# Patient Record
Sex: FEMALE | Race: WHITE | HISPANIC OR LATINO | Employment: UNEMPLOYED | URBAN - METROPOLITAN AREA
[De-identification: names, ages, dates, MRNs, and addresses within clinical notes are randomized per-mention and may not be internally consistent; named-entity substitution may affect disease eponyms.]

---

## 2024-04-02 ENCOUNTER — INITIAL PRENATAL (OUTPATIENT)
Dept: OBSTETRICS AND GYNECOLOGY | Facility: CLINIC | Age: 34
End: 2024-04-02
Payer: COMMERCIAL

## 2024-04-02 VITALS — SYSTOLIC BLOOD PRESSURE: 134 MMHG | DIASTOLIC BLOOD PRESSURE: 83 MMHG | WEIGHT: 161.4 LBS

## 2024-04-02 DIAGNOSIS — Z34.91 INITIAL OBSTETRIC VISIT IN FIRST TRIMESTER: Primary | ICD-10-CM

## 2024-04-02 DIAGNOSIS — O09.32 INITIAL OBSTETRIC VISIT IN SECOND TRIMESTER: ICD-10-CM

## 2024-04-02 PROCEDURE — 99204 OFFICE O/P NEW MOD 45 MIN: CPT | Performed by: OBSTETRICS & GYNECOLOGY

## 2024-04-02 RX ORDER — LANOLIN ALCOHOL/MO/W.PET/CERES
400 CREAM (GRAM) TOPICAL DAILY
COMMUNITY

## 2024-04-02 RX ORDER — VITAMIN A, ASCORBIC ACID, CHOLECALCIFEROL, .ALPHA.-TOCOPHEROL ACETATE, DL-, THIAMINE MONONITRATE, RIBOFLAVIN, NIACINAMIDE, PYRIDOXINE HYDROCHLORIDE, FOLIC ACID, CYANOCOBALAMIN, CALCIUM CARBONATE, IRON, ZINC OXIDE, AND CUPRIC OXIDE 4000; 120; 400; 22; 1.84; 3; 20; 10; 1; 12; 200; 29; 25; 2 [IU]/1; MG/1; [IU]/1; [IU]/1; MG/1; MG/1; MG/1; MG/1; MG/1; UG/1; MG/1; MG/1; MG/1; MG/1
1 TABLET ORAL DAILY
Qty: 30 TABLET | Refills: 11 | Status: SHIPPED | OUTPATIENT
Start: 2024-04-02

## 2024-04-02 RX ORDER — DIPHENHYDRAMINE HYDROCHLORIDE 25 MG/1
25 CAPSULE ORAL 3 TIMES DAILY
Qty: 90 TABLET | Refills: 3 | Status: SHIPPED | OUTPATIENT
Start: 2024-04-02

## 2024-04-02 NOTE — PROGRESS NOTES
CC: Initial obstetric visit    HPI: 33-year-old  1 para 0 presents at 6-6/7 weeks by her certain last menstrual cycle for an initial obstetric visit.  Overall, she is feeling well.  She does have nausea, but no vomiting.  Denies abdominal or pelvic pain.  Denies vaginal bleeding.    Review of systems    This is negative for fever or chills.  Negative for abdominal or pelvic pain.  Negative for vaginal bleeding.  Positive for nausea.  All other systems are reviewed and are negative.    Assessment and plan    1.  Intrauterine pregnancy at 6-6/7 weeks.  I recommend an ultrasound to confirm estimated gestational age and the patient agrees.  2.  Prenatal counseling was undertaken and questions were answered.  Prenatal labs today.  Prenatal vitamins have been prescribed.  3.  Nausea with minimal vomiting.  Counseled.  We discussed strategies to minimize this such as frequent small meals, the use of yevgeniy and the use of vitamin B6.  4.  Counseled regarding genetic screening.  The patient is considering noninvasive prenatal screening.

## 2024-04-03 LAB
ABO GROUP BLD: NORMAL
AMPHETAMINES UR QL SCN: NEGATIVE NG/ML
BARBITURATES UR QL SCN: NEGATIVE NG/ML
BASOPHILS # BLD AUTO: 0 X10E3/UL (ref 0–0.2)
BASOPHILS NFR BLD AUTO: 0 %
BENZODIAZ UR QL: NEGATIVE NG/ML
BLD GP AB SCN SERPL QL: NEGATIVE
BZE UR QL: NEGATIVE NG/ML
CANNABINOIDS UR QL SCN: NEGATIVE NG/ML
EOSINOPHIL # BLD AUTO: 0 X10E3/UL (ref 0–0.4)
EOSINOPHIL NFR BLD AUTO: 0 %
ERYTHROCYTE [DISTWIDTH] IN BLOOD BY AUTOMATED COUNT: 13.3 % (ref 11.7–15.4)
HBV SURFACE AG SERPL QL IA: NEGATIVE
HCT VFR BLD AUTO: 40.2 % (ref 34–46.6)
HCV IGG SERPL QL IA: NON REACTIVE
HGB BLD-MCNC: 13.2 G/DL (ref 11.1–15.9)
HIV 1+2 AB+HIV1 P24 AG SERPL QL IA: NON REACTIVE
IMM GRANULOCYTES # BLD AUTO: 0 X10E3/UL (ref 0–0.1)
IMM GRANULOCYTES NFR BLD AUTO: 0 %
LYMPHOCYTES # BLD AUTO: 2.4 X10E3/UL (ref 0.7–3.1)
LYMPHOCYTES NFR BLD AUTO: 26 %
MCH RBC QN AUTO: 28.8 PG (ref 26.6–33)
MCHC RBC AUTO-ENTMCNC: 32.8 G/DL (ref 31.5–35.7)
MCV RBC AUTO: 88 FL (ref 79–97)
METHADONE UR QL SCN: NEGATIVE NG/ML
MONOCYTES # BLD AUTO: 0.7 X10E3/UL (ref 0.1–0.9)
MONOCYTES NFR BLD AUTO: 7 %
NEUTROPHILS # BLD AUTO: 6.3 X10E3/UL (ref 1.4–7)
NEUTROPHILS NFR BLD AUTO: 67 %
OPIATES UR QL: NEGATIVE NG/ML
PCP UR QL SCN: NEGATIVE NG/ML
PLATELET # BLD AUTO: 182 X10E3/UL (ref 150–450)
PROPOXYPH UR QL SCN: NEGATIVE NG/ML
RBC # BLD AUTO: 4.59 X10E6/UL (ref 3.77–5.28)
RH BLD: NEGATIVE
RPR SER QL: NON REACTIVE
RUBV IGG SERPL IA-ACNC: 2.57 INDEX
WBC # BLD AUTO: 9.4 X10E3/UL (ref 3.4–10.8)

## 2024-04-04 LAB
BACTERIA UR CULT: NO GROWTH
BACTERIA UR CULT: NORMAL

## 2024-04-05 LAB
C TRACH RRNA CVX QL NAA+PROBE: NEGATIVE
CONV .: NORMAL
CYTOLOGIST CVX/VAG CYTO: NORMAL
CYTOLOGY CVX/VAG DOC CYTO: NORMAL
CYTOLOGY CVX/VAG DOC THIN PREP: NORMAL
DX ICD CODE: NORMAL
Lab: NORMAL
N GONORRHOEA RRNA CVX QL NAA+PROBE: NEGATIVE
OTHER STN SPEC: NORMAL
STAT OF ADQ CVX/VAG CYTO-IMP: NORMAL
T VAGINALIS RRNA SPEC QL NAA+PROBE: NEGATIVE

## 2024-04-30 ENCOUNTER — ROUTINE PRENATAL (OUTPATIENT)
Dept: OBSTETRICS AND GYNECOLOGY | Facility: CLINIC | Age: 34
End: 2024-04-30
Payer: COMMERCIAL

## 2024-04-30 VITALS — WEIGHT: 161.8 LBS | SYSTOLIC BLOOD PRESSURE: 126 MMHG | DIASTOLIC BLOOD PRESSURE: 82 MMHG

## 2024-04-30 DIAGNOSIS — Z3A.10 10 WEEKS GESTATION OF PREGNANCY: Primary | ICD-10-CM

## 2024-04-30 DIAGNOSIS — N89.8 DISCHARGE FROM THE VAGINA: ICD-10-CM

## 2024-04-30 LAB
GLUCOSE UR STRIP-MCNC: NEGATIVE MG/DL
PROT UR STRIP-MCNC: NEGATIVE MG/DL

## 2024-04-30 PROCEDURE — 99213 OFFICE O/P EST LOW 20 MIN: CPT | Performed by: OBSTETRICS & GYNECOLOGY

## 2024-04-30 RX ORDER — PROMETHAZINE HYDROCHLORIDE 12.5 MG/1
12.5 TABLET ORAL EVERY 6 HOURS PRN
Qty: 30 TABLET | Refills: 0 | Status: SHIPPED | OUTPATIENT
Start: 2024-04-30

## 2024-04-30 NOTE — PROGRESS NOTES
CC: Obstetric visit    HPI: 33-year-old  1 para 0 presents at 10-6/7 weeks for her obstetric visit.  She vomits approximately twice daily.  Otherwise, she is feeling well.  She does complain of a thin vaginal discharge.    Review of systems    This is negative for fever or chills.  Positive for nausea and vomiting.  Negative for vaginal bleeding.    Physical examination    The abdomen is soft and gravid.  There is no epigastric tenderness.  No fundal tenderness.  No suprapubic tenderness.  Extremities are equal in size    Assessment and plan    1.  Intrauterine pregnancy at 10-6/7 weeks  2.  Today's ultrasound was ordered due to history of subchorionic hematoma.  This shows complete resolution of the hematoma and confirms the patient's gestational age.  3.  Nausea and vomiting.  Counseled.  The patient is eating frequent small meals and using ginger ale.  We will add as needed Phenergan.  4.  Vaginal discharge.  On physical examination, this appears physiologic.  Cultures for BV and yeast performed.  5.  Counseled regarding genetic screening.  The patient would like to proceed with free fetal DNA testing and cystic fibrosis carrier screening.

## 2024-05-02 ENCOUNTER — TELEPHONE (OUTPATIENT)
Dept: OBSTETRICS AND GYNECOLOGY | Facility: CLINIC | Age: 34
End: 2024-05-02
Payer: COMMERCIAL

## 2024-05-02 LAB
A VAGINAE DNA VAG QL NAA+PROBE: ABNORMAL SCORE
BVAB2 DNA VAG QL NAA+PROBE: ABNORMAL SCORE
C ALBICANS DNA VAG QL NAA+PROBE: POSITIVE
C GLABRATA DNA VAG QL NAA+PROBE: NEGATIVE
C TRACH DNA VAG QL NAA+PROBE: NEGATIVE
MEGA1 DNA VAG QL NAA+PROBE: ABNORMAL SCORE
N GONORRHOEA DNA VAG QL NAA+PROBE: NEGATIVE
T VAGINALIS DNA VAG QL NAA+PROBE: NEGATIVE

## 2024-05-04 LAB
CFDNA.FET/CFDNA.TOTAL SFR FETUS: NORMAL %
CITATION REF LAB TEST: NORMAL
FET 13+18+21+X+Y ANEUP PLAS.CFDNA: NEGATIVE
FET CHR 21 TS PLAS.CFDNA QL: NEGATIVE
FET SEX PLAS.CFDNA DOSAGE CFDNA: NORMAL
FET TS 13 RISK PLAS.CFDNA QL: NEGATIVE
FET TS 18 RISK WBC.DNA+CFDNA QL: NEGATIVE
GA EST FROM CONCEPTION DATE: NORMAL D
GESTATIONAL AGE > 9:: YES
LAB DIRECTOR NAME PROVIDER: NORMAL
LAB DIRECTOR NAME PROVIDER: NORMAL
LABORATORY COMMENT REPORT: NORMAL
LIMITATIONS OF THE TEST: NORMAL
NEGATIVE PREDICTIVE VALUE: NORMAL
NOTE: NORMAL
PERFORMANCE CHARACTERISTICS: NORMAL
POSITIVE PREDICTIVE VALUE: NORMAL
REF LAB TEST METHOD: NORMAL
TEST PERFORMANCE INFO SPEC: NORMAL

## 2024-05-07 ENCOUNTER — TELEPHONE (OUTPATIENT)
Dept: OBSTETRICS AND GYNECOLOGY | Facility: CLINIC | Age: 34
End: 2024-05-07
Payer: COMMERCIAL

## 2024-05-22 LAB
CFTR FULL MUT ANL BLD/T SEQ: NORMAL
CITATION REF LAB TEST: NORMAL
CLINICAL INFO: NORMAL
ETHNIC BACKGROUND STATED: NORMAL
GENE DIS ANL CARRIER INTERP-IMP: NORMAL
LAB DIRECTOR NAME PROVIDER: NORMAL
REASON FOR REFERRAL (NARRATIVE): NORMAL
RECOMMENDATION PATIENT DOC-IMP: NORMAL
REF LAB TEST METHOD: NORMAL
SERVICE CMNT-IMP: NORMAL
SPECIMEN SOURCE: NORMAL

## 2024-05-28 ENCOUNTER — ROUTINE PRENATAL (OUTPATIENT)
Dept: OBSTETRICS AND GYNECOLOGY | Facility: CLINIC | Age: 34
End: 2024-05-28
Payer: COMMERCIAL

## 2024-05-28 VITALS — WEIGHT: 163 LBS | SYSTOLIC BLOOD PRESSURE: 121 MMHG | DIASTOLIC BLOOD PRESSURE: 79 MMHG

## 2024-05-28 DIAGNOSIS — M25.551 PAIN OF RIGHT HIP: ICD-10-CM

## 2024-05-28 DIAGNOSIS — Z13.89 SCREENING FOR BLOOD OR PROTEIN IN URINE: ICD-10-CM

## 2024-05-28 DIAGNOSIS — M54.50 ACUTE BILATERAL LOW BACK PAIN, UNSPECIFIED WHETHER SCIATICA PRESENT: ICD-10-CM

## 2024-05-28 DIAGNOSIS — Z3A.14 14 WEEKS GESTATION OF PREGNANCY: Primary | ICD-10-CM

## 2024-05-28 LAB
BILIRUB BLD-MCNC: NEGATIVE MG/DL
CLARITY, POC: ABNORMAL
COLOR UR: YELLOW
GLUCOSE UR STRIP-MCNC: NEGATIVE MG/DL
KETONES UR QL: NEGATIVE
LEUKOCYTE EST, POC: ABNORMAL
NITRITE UR-MCNC: NEGATIVE MG/ML
PH UR: 6.5 [PH] (ref 5–8)
PROT UR STRIP-MCNC: NEGATIVE MG/DL
RBC # UR STRIP: ABNORMAL /UL
SP GR UR: 1.01 (ref 1–1.03)
UROBILINOGEN UR QL: NORMAL

## 2024-05-28 PROCEDURE — 99213 OFFICE O/P EST LOW 20 MIN: CPT | Performed by: OBSTETRICS & GYNECOLOGY

## 2024-05-28 RX ORDER — FAMOTIDINE 20 MG/1
20 TABLET, FILM COATED ORAL DAILY
Qty: 30 TABLET | Refills: 1 | Status: SHIPPED | OUTPATIENT
Start: 2024-05-28 | End: 2025-05-28

## 2024-05-28 NOTE — PROGRESS NOTES
CC: Obstetric visit    HPI: 33-year-old  1 para 0 presents at 14-6/7 weeks for her obstetric visit.  She complains of pain in the right hip which is better when she walks and is more severe when she is laying down.  Otherwise, she is feeling well.    Review of systems    This is negative for nausea and vomiting.  Negative for fever and chills.  Negative for vaginal bleeding.    Physical examination    The abdomen is soft and gravid.  There is no epigastric tenderness.  No fundal tenderness.  No right upper quadrant tenderness.  No suprapubic tenderness.  Extremities are equal in size    Assessment and plan    1.  Intrauterine pregnancy at 14-6/7 weeks  2.  Right hip pain.  This appears on physical examination to be localized to the right hip.  It is not present at the costovertebral angle.  Urine has been sent for culture, but I believe the pain is related to the right hip joint.  I recommend physical therapy and the patient agrees.  A referral has been sent.  3.  Nausea and vomiting have resolved.  4.  Unable to Doppler fetal heart tones at bedside.  We will check a limited ultrasound to confirm viability and estimated gestational age.

## 2024-05-29 ENCOUNTER — PATIENT MESSAGE (OUTPATIENT)
Dept: OBSTETRICS AND GYNECOLOGY | Facility: CLINIC | Age: 34
End: 2024-05-29
Payer: COMMERCIAL

## 2024-05-29 NOTE — TELEPHONE ENCOUNTER
Pt message translated:   I need something for the back pain in the right lumbar region that he advises me to take, I can barely walk because it has worsened.    Please advise, thank you!

## 2024-05-29 NOTE — TELEPHONE ENCOUNTER
This was also sent to me as a phone call earlier.  Please contact the patient and advised her to come to the emergency room.  Pain so severe that she cannot walk is not something that will be handled by a telephone prescription.  Thank you.

## 2024-05-29 NOTE — TELEPHONE ENCOUNTER
Called patient  because she had not been to ED yet. Patient states she is feeling better and feels she does not need to go at this time but will if it starts again.

## 2024-05-30 LAB
BACTERIA UR CULT: NO GROWTH
BACTERIA UR CULT: NORMAL

## 2024-06-11 ENCOUNTER — HOSPITAL ENCOUNTER (OUTPATIENT)
Dept: PHYSICAL THERAPY | Facility: HOSPITAL | Age: 34
Setting detail: THERAPIES SERIES
Discharge: HOME | End: 2024-06-11
Payer: COMMERCIAL

## 2024-06-11 DIAGNOSIS — M25.551 RIGHT HIP PAIN: Primary | ICD-10-CM

## 2024-06-11 PROCEDURE — 97162 PT EVAL MOD COMPLEX 30 MIN: CPT

## 2024-06-11 NOTE — THERAPY EVALUATION
Outpatient Physical Therapy Pelvic Health Initial Evaluation  [facility]     Patient Name: Latha Dawson  : 1990  MRN: [id number]  Today's Date: 2024        Visit Date: 2024    There is no problem list on file for this patient.       No past medical history on file.     Past Surgical History:   Procedure Laterality Date    OVARY BIOPSY           Visit Dx:    ICD-10-CM ICD-9-CM   1. Right hip pain  M25.551 719.45        Patient History       Row Name 24 1400             Daily Activities    Primary Language Mexican   -CC      Action taken if English not primary   -CC      Does patient have problems with the following? None  -CC      Barriers to learning Language  -CC      Pt Participated in POC and Goals Yes  -CC                User Key  (r) = Recorded By, (t) = Taken By, (c) = Cosigned By      Initials Name Provider Type    CC Elli Edwards, BELIA Physical Therapist                     Pelvic Health       Row Name 24 1400             Subjective    Patient Reason for Visit Hip Pain;Prenatal Care  -CC      Brief Description of Chief Complaint Latha Dawson is a 33 y.o. female who presents today with R hip pain during pregnancy. Pt is currently 16w6d GA, . Onset of pain about 1 month ago. Pt says she prefers lying on her back for sleeping, but OB recommended she sleeping on her left side, and feels the pain started around this time. She does use a pillow between her knees. Latha Dawson reports difficulty/increased pain with sleeping, navigating steps. Pain relieving factors include rest. Pt has returned to sleeping on her back without use of pregnancy pillow which has helped pain. PMH neck pain  -CC      Patient Participated in POC and Goals Yes  -CC         Daily Activities    Primary Language Mexican   -CC      Action taken if English not primary   -CC      Does patient have problems with the following? None   -CC      Barriers to learning Language  -CC      Pt Participated in POC and Goals Yes  -CC         Pregnancy/Sexual History    Number of Pregnancies 1  -CC         Pain Assessment    Pain Score 4  -CC         Lumbar/SI Special Tests    FAIR Test (Piriformis Syndrome) Right:;Positive  -CC         General ROM    GENERAL ROM COMMENTS lumbar and hip ROM WFL, some pain with lumbar rot brigette  -CC         Education Provided On:    Education Points HEP  -CC      Method of Delivery Verbal;Demonstration;Written  -CC      Education Provided To Patient  -CC      Level of Understanding Teach back education performed;Verbalized;Demonstrated  -CC      HEP Comments Access Code [email or web address]  -CC      Education Comments Anatomical changes during pregnancy, eval findings, goals of PT, POC  -CC         MMT (Manual Muscle Testing)    Rt Lower Ext Rt Hip Flexion;Rt Hip ABduction;Rt Knee Extension;Rt Knee Flexion  -CC      Lt Lower Ext Lt Hip Flexion;Lt Hip ABduction;Lt Knee Extension;Lt Knee Flexion  -CC         MMT Right Lower Ext    Rt Hip Flexion MMT, Gross Movement (5/5) normal  -CC      Rt Hip ABduction MMT, Gross Movement (4-/5) good minus  pain  -CC      Rt Knee Extension MMT, Gross Movement (5/5) normal  -CC      Rt Knee Flexion MMT, Gross Movement (5/5) normal  -CC         MMT Left Lower Ext    Lt Hip Flexion MMT, Gross Movement (5/5) normal  -CC      Lt Hip ABduction MMT, Gross Movement (4+/5) good plus  -CC      Lt Knee Extension MMT, Gross Movement (5/5) normal  -CC      Lt Knee Flexion MMT, Gross Movement (5/5) normal  -CC                User Key  (r) = Recorded By, (t) = Taken By, (c) = Cosigned By      Initials Name Provider Type    Elli Swan, PT Physical Therapist                   PT Ortho       Row Name 06/11/24 1400       Lumbosacral Palpation    Lumbosacral Palpation? Yes  -CC    SI Right:;Tender  -CC    Quadratus Lumborum Right:;Tender  -CC    Erector Spinae (Paraspinals) Right:;Tender;Guarded/taut  -CC        Hip/Thigh Palpation    Piriformis Right:;Tender  -CC              User Key  (r) = Recorded By, (t) = Taken By, (c) = Cosigned By      Initials Name Provider Type    CC Elli Edwards, PT Physical Therapist                                PT Assessment/Plan       Row Name 06/11/24 1400          PT Assessment    Functional Limitations Limitation in home management;Limitations in community activities;Limitations in functional capacity and performance;Performance in leisure activities;Performance in self-care ADL  -CC     Impairments Posture;Poor body mechanics;Pain;Muscle strength;Impaired flexibility  -CC     Assessment Comments Latha Dawson is a 33 y.o. female referred to physical therapy for R hip pain during pregnancy, currently 16 weeks, 6d GA, KEYANNA 11/20/24. She presents with an evolving clinical presentation, along with no remarkable comorbidities and personal factors of communication barrier requiring  that may impact her progress in the plan of care. Pt presents today with TTP R SIJ/lumbar tissues/R hip girdle, decreased R hip girdle strength, and (+) FADIR test. Her signs and symptoms are consistent with referring diagnosis. The previous impairments limit her ability to sleep though the night without pain, and navigating steps. Pt will benefit from skilled PT to address the previous impairments and return to PLOF.  -CC     Please refer to paper survey for additional self-reported information No  -CC     Rehab Potential Good  -CC     Patient/caregiver participated in establishment of treatment plan and goals Yes  -CC     Patient would benefit from skilled therapy intervention Yes  -CC        PT Plan    PT Frequency 1x/week  -CC     Predicted Duration of Therapy Intervention (PT) 6-8 visits  -CC     Planned CPT's? PT EVAL MOD COMPLELITY: 61867;PT RE-EVAL: 86246;PT THER PROC EA 15 MIN: 50747;PT THER ACT EA 15 MIN: 34827;PT MANUAL THERAPY EA 15 MIN: 01434;PT NEUROMUSC RE-EDUCATION EA 15  MIN: 85735;PT GAIT TRAINING EA 15 MIN: 87079;PT SELF CARE/HOME MGMT/TRAIN EA 15: 24823;PT HOT OR COLD PACK TREAT MCARE  -CC     PT Plan Comments Next visit: STM R lumbar tissues and R hip girdle, HL hip abd/add, PPT, qped TA  -CC               User Key  (r) = Recorded By, (t) = Taken By, (c) = Cosigned By      Initials Name Provider Type    Elli Swan, PT Physical Therapist                       OP Exercises       Row Name 24 1500 24 1400          Subjective    Patient Reason for Visit -- Hip Pain;Prenatal Care  -CC     Brief Description of Chief Complaint -- Latha Dawson is a 33 y.o. female who presents today with R hip pain during pregnancy. Pt is currently 16w6d GA, . Onset of pain about 1 month ago. Pt says she prefers lying on her back for sleeping, but OB recommended she sleeping on her left side, and feels the pain started around this time. She does use a pillow between her knees. Latha Dawson reports difficulty/increased pain with sleeping, navigating steps. Pain relieving factors include rest. Pt has returned to sleeping on her back without use of pregnancy pillow which has helped pain. PMH neck pain  -CC     Patient Participated in POC and Goals -- Yes  -CC        Total Minutes    97777 - PT Therapeutic Exercise Minutes 2  -CC --        Exercise 1    Exercise Name 1 EOB piriformis str  -CC --     Cueing 1 Verbal;Demo  -CC --     Reps 1 3 R  -CC --     Time 1 20sec  -CC --               User Key  (r) = Recorded By, (t) = Taken By, (c) = Cosigned By      Initials Name Provider Type    CC Elli Edwards, PT Physical Therapist                                 PT OP Goals       Row Name 24 1500          PT Short Term Goals    STG Date to Achieve 24  -CC     STG 1 Pt will be independent with initial HEP for hip pain reduction during pregnancy.  -CC     STG 1 Progress New  -CC     STG 2 Minimize nighttime back pain interference with sleep quality by  achieving at least a 50% reduction in the frequency and severity of nocturnal back pain episodes  -     STG 2 Progress New  -     STG 3 Pt will verbalize increased awareness of proper posture during sitting, standing, and walking to reduce strain on the back, with a focus on maintaining a neutral spine alignment to minimize stress of pregnancy related anatomical changes on back.  -     STG 3 Progress New  [facility]        Long Term Goals    LTG Date to Achieve 08/10/24  -     LTG 1 Pt will be independent with comprehensive HEP to address core/hip/pelvic floor strength during pregnancy to manage back pain and prepare body for labor and the postpartum period.  -     LTG 1 Progress New  [facility]     LTG 2 Pt will demo at least 4/5 R hip abd strength to improve support for back and pelvis throughout pregnancy.  -     LTG 2 Progress New  [facility]     LTG 3 Pt will verbalize good understanding of exercises and techniques to optimize pelvic floor function, improve endurance, and enhance relaxation skills in preparation for labor and delivery.  -     LTG 3 Progress New  [facility]     LTG 4 Pt will report </=2/10 hip pain with ADLs.  -     LTG 4 Progress New  [facility]        Time Calculation    PT Goal Re-Cert Due Date 09/09/24  -               User Key  (r) = Recorded By, (t) = Taken By, (c) = Cosigned By      Initials Name Provider Type    CC Elli Edwards, PT Physical Therapist                                    Time Calculation:   Start Time: 1406  Stop Time: 1433  Time Calculation (min): 27 min  Total Timed Code Minutes- PT: 2 minute(s)  Timed Charges  25324 - PT Therapeutic Exercise Minutes: 2  Untimed Charges  PT Eval/Re-eval Minutes: 25  Total Minutes  Timed Charges Total Minutes: 2  Untimed Charges Total Minutes: 25   Total Minutes: 27  Therapy Charges for Today       Code Description Service Date Service Provider Modifiers Qty    [id number] HC PT EVAL MOD COMPLEXITY 3 6/11/2024 Elli Edwards, PT GP 1                     Elli Edwards, PT  6/11/2024

## 2024-06-12 ENCOUNTER — REFERRAL TRIAGE (OUTPATIENT)
Dept: LABOR AND DELIVERY | Facility: HOSPITAL | Age: 34
End: 2024-06-12
Payer: COMMERCIAL

## 2024-06-19 ENCOUNTER — PATIENT OUTREACH (OUTPATIENT)
Dept: LABOR AND DELIVERY | Facility: HOSPITAL | Age: 34
End: 2024-06-19
Payer: COMMERCIAL

## 2024-06-19 NOTE — OUTREACH NOTE
[facility]  Enrollment    Current Estimated Gestational Age: 18w0d    Questions/Answers      Flowsheet Row Responses   Would like to participate? Yes   Date of Intake Visit 06/19/24          [facility]  Intake    Current Estimated Gestational Age: 18w0d    Intake Assessment      Flowsheet Row Responses   Best Method for Contacting Cell   Currently Employed No   Able to keep appointments as scheduled Yes   Gender(s) and Name(s) girl   Do you have a dentist? No   Resources Presently Utilizing: WIC (Women, Infant, Children)   Maternal Warning Signs Provided   Other Education Insurance benefits/Incentives, WIC Benefits, How to find a pediatrician, How to find a dentist, How to find a primary care provider, HANDS            Learning Assessment      Flowsheet Row Responses   Relationship Patient   Learner Name Latha   Does the learner have any barriers to learning? No Barriers, Language   What is the preferred language of the learner for medical teaching? [place]ian   Is an  required? Yes   How does the learner prefer to learn new concepts? Listening, Reading, Demonstration, Pictures/Video          Intake completed today with  [id number]. She is doing well and just starting to feel fetal movement. She lives with her sister in a house and she reports stable housing and reliable transportation. FOB involved but they do not live together. She had questions about location of L&D and this was reviewed. She has already received her car seat from [employer] and resources for other supplies were sent. She is already receiving WIC/SNAP. F/u around 28 weeks.     Elaine Obrien RN  Maternity Nurse Navigator    6/19/2024, 10:48 EDT          Elaine Obrien RN  Maternity Nurse Navigator    6/19/2024, 10:48 EDT

## 2024-06-21 ENCOUNTER — APPOINTMENT (OUTPATIENT)
Dept: PHYSICAL THERAPY | Facility: HOSPITAL | Age: 34
End: 2024-06-21
Payer: COMMERCIAL

## 2024-06-25 ENCOUNTER — ROUTINE PRENATAL (OUTPATIENT)
Dept: OBSTETRICS AND GYNECOLOGY | Facility: CLINIC | Age: 34
End: 2024-06-25
Payer: COMMERCIAL

## 2024-06-25 VITALS — DIASTOLIC BLOOD PRESSURE: 87 MMHG | SYSTOLIC BLOOD PRESSURE: 133 MMHG | WEIGHT: 170 LBS

## 2024-06-25 DIAGNOSIS — O26.892 PREGNANCY HEADACHE IN SECOND TRIMESTER: ICD-10-CM

## 2024-06-25 DIAGNOSIS — R51.9 PREGNANCY HEADACHE IN SECOND TRIMESTER: ICD-10-CM

## 2024-06-25 DIAGNOSIS — Z3A.18 18 WEEKS GESTATION OF PREGNANCY: Primary | ICD-10-CM

## 2024-06-25 PROCEDURE — 99213 OFFICE O/P EST LOW 20 MIN: CPT | Performed by: OBSTETRICS & GYNECOLOGY

## 2024-06-25 RX ORDER — MAGNESIUM OXIDE 400 MG/1
400 TABLET ORAL DAILY
Qty: 30 TABLET | Refills: 11 | Status: SHIPPED | OUTPATIENT
Start: 2024-06-25

## 2024-06-25 NOTE — PROGRESS NOTES
CC: Obstetric visit    HPI: 33-year-old  1 para 0 presents at 18-6/7 weeks for her obstetric visit.  She has begun to appreciate fetal movement.  She does have intermittent headaches.  She has been taking Tylenol for this but would like to take something different.    Review of systems    This is positive for headaches.  Negative for vision changes.  Negative for abdominal or pelvic pain.  Negative for vaginal bleeding.    Physical examination    The abdomen is soft and gravid.  There is no epigastric tenderness.  No fundal tenderness.  No CVA tenderness.  No suprapubic tenderness.  Extremities are equal in size    Assessment and plan    1.  Intrauterine pregnancy at 18-6/7 weeks  2.  Free fetal DNA testing and cystic fibrosis carrier screening are negative.  Counseled.  We discussed AFP testing and the patient would like to proceed.  This has been ordered.  3.  Screening ultrasound next visit.  4.  Slight elevation in blood pressure.  There are no severe range pressures and the patient is not symptomatic.  I recommend a repeat visit in 2 weeks for blood pressure check and the patient agrees.

## 2024-06-27 LAB
AFP INTERP SERPL-IMP: NORMAL
AFP INTERP SERPL-IMP: NORMAL
AFP MOM SERPL: 1.2
AFP SERPL-MCNC: 55.4 NG/ML
AGE AT DELIVERY: 32.9 YR
GA METHOD: NORMAL
GA: 18.9 WEEKS
IDDM PATIENT QL: NORMAL
LABORATORY COMMENT REPORT: NORMAL
MULTIPLE PREGNANCY: NORMAL
NEURAL TUBE DEFECT RISK FETUS: 6499 %
RESULT: NORMAL

## 2024-06-28 ENCOUNTER — APPOINTMENT (OUTPATIENT)
Dept: PHYSICAL THERAPY | Facility: HOSPITAL | Age: 34
End: 2024-06-28
Payer: COMMERCIAL

## 2024-07-12 ENCOUNTER — ROUTINE PRENATAL (OUTPATIENT)
Dept: OBSTETRICS AND GYNECOLOGY | Facility: CLINIC | Age: 34
End: 2024-07-12
Payer: COMMERCIAL

## 2024-07-12 VITALS — WEIGHT: 173.2 LBS | SYSTOLIC BLOOD PRESSURE: 125 MMHG | DIASTOLIC BLOOD PRESSURE: 77 MMHG

## 2024-07-12 DIAGNOSIS — Z3A.21 21 WEEKS GESTATION OF PREGNANCY: Primary | ICD-10-CM

## 2024-07-12 NOTE — PROGRESS NOTES
CC: Obstetric visit    HPI: 33-year-old  1 para 0 presents at 21-2/7 weeks for her obstetric visit.  She has begun to appreciate fetal movement.  Her ultrasound shows a normal anatomic survey with normal cervical length.  This is limited by suboptimal visualization of the fetal spine, face and lower extremities.    Review of systems    This is negative for fever or chills.  Negative for nausea or vomiting.  Negative for abdominal or pelvic pain.    Physical examination    The abdomen is soft and gravid.  There is no epigastric tenderness.  No fundal tenderness.  No CVA tenderness.  No suprapubic tenderness.  Extremities are equal in size    Assessment and plan    1.  Intrauterine pregnancy at 21-2/7 weeks  2.  Screening ultrasound was reviewed and discussed with the patient.  This is a normal anatomic survey except that it was limited by suboptimal views of the fetal face, lower extremities and spine.  I counseled the patient.  She understands that this does not mean abnormalities were found only, that all of the anatomy was not well-visualized.  I recommend a repeat ultrasound next visit and the patient agrees.  3.  The patient is experiencing a toothache.  She plans to go for dental checkup.  4.  1 hour glucose tolerance test at the next visit.  Counseled.

## 2024-08-09 ENCOUNTER — LAB (OUTPATIENT)
Dept: OBSTETRICS AND GYNECOLOGY | Facility: CLINIC | Age: 34
End: 2024-08-09
Payer: COMMERCIAL

## 2024-08-09 ENCOUNTER — ROUTINE PRENATAL (OUTPATIENT)
Dept: OBSTETRICS AND GYNECOLOGY | Facility: CLINIC | Age: 34
End: 2024-08-09
Payer: COMMERCIAL

## 2024-08-09 VITALS — DIASTOLIC BLOOD PRESSURE: 76 MMHG | SYSTOLIC BLOOD PRESSURE: 118 MMHG | WEIGHT: 181 LBS

## 2024-08-09 DIAGNOSIS — Z3A.25 25 WEEKS GESTATION OF PREGNANCY: Primary | ICD-10-CM

## 2024-08-09 DIAGNOSIS — Z3A.21 21 WEEKS GESTATION OF PREGNANCY: ICD-10-CM

## 2024-08-09 NOTE — PROGRESS NOTES
CC: Obstetric visit    HPI: 33-year-old  1 para 0 presents at 25-2/7 weeks for her obstetric visit.  Her baby is moving actively.  She complains of some abdominal wall stretching and itching.  She is concerned about the development of stretch marks.    Review of systems    This is negative for fever or chills.  Negative for nausea or vomiting.  Negative for abdominal or pelvic pain.    Physical examination    The abdomen is soft and gravid.  There is no epigastric tenderness.  No fundal tenderness.  No right upper quadrant tenderness.  No suprapubic tenderness.  Extremities are equal in size with minimal pedal edema    Assessment and plan    1.  Intrauterine pregnancy at 25-2/7 weeks  2.  The patient's blood type is O-.  The baby's father's blood type is a positive.  Counseled.  I recommend RhoGAM at 28 weeks and the patient agrees.  3.  Screening ultrasound was normal at the last visit, but there were suboptimal views of the face and lower extremities.  I recommend a repeat ultrasound today and the patient agrees.  4.  1 hour glucose tolerance test today.

## 2024-08-10 LAB — GLUCOSE 1H P 50 G GLC PO SERPL-MCNC: 153 MG/DL (ref 70–139)

## 2024-08-12 LAB
BLD GP AB SCN SERPL QL: NEGATIVE
TREPONEMA PALLIDUM IGG+IGM AB [PRESENCE] IN SERUM OR PLASMA BY IMMUNOASSAY: NON REACTIVE

## 2024-08-14 ENCOUNTER — TELEPHONE (OUTPATIENT)
Dept: OBSTETRICS AND GYNECOLOGY | Facility: CLINIC | Age: 34
End: 2024-08-14
Payer: COMMERCIAL

## 2024-08-14 NOTE — TELEPHONE ENCOUNTER
----- Message from Ruddy Medrano sent at 8/12/2024  4:59 PM EDT -----  Please contact the patient and let her know that her 1 hour glucose tolerance test is elevated.  Please help her to schedule a 3-hour test.  Thank you.

## 2024-08-16 ENCOUNTER — ROUTINE PRENATAL (OUTPATIENT)
Dept: OBSTETRICS AND GYNECOLOGY | Facility: CLINIC | Age: 34
End: 2024-08-16
Payer: COMMERCIAL

## 2024-08-16 VITALS — WEIGHT: 184 LBS | DIASTOLIC BLOOD PRESSURE: 83 MMHG | SYSTOLIC BLOOD PRESSURE: 126 MMHG

## 2024-08-16 DIAGNOSIS — Z3A.26 26 WEEKS GESTATION OF PREGNANCY: Primary | ICD-10-CM

## 2024-08-16 DIAGNOSIS — Z34.82 PRENATAL CARE, SUBSEQUENT PREGNANCY IN SECOND TRIMESTER: ICD-10-CM

## 2024-08-16 LAB
GLUCOSE UR STRIP-MCNC: NEGATIVE MG/DL
PROT UR STRIP-MCNC: ABNORMAL MG/DL

## 2024-08-16 NOTE — PROGRESS NOTES
OB follow up     Latha Dawson is a 33 y.o.  26w2d being seen today for her obstetrical visit.  Patient reports no complaints. Fetal movement: normal.  She is getting her 3-hour glucose test today.    Her prenatal care is complicated by (and status): Nothing    Review of Systems  Cramping/contractions : Negative  Vaginal bleeding: Negative  Fetal movement is normal    /83   Wt 83.5 kg (184 lb)   LMP 2024     FHT: 144 BPM   Uterine Size: size equals dates       Assessment    Diagnoses and all orders for this visit:    1. 26 weeks gestation of pregnancy (Primary)  -     Gestational Screen 3 Hr ([facility])  -     POC Urinalysis Dipstick    2. Prenatal care, subsequent pregnancy in second trimester        1) pregnancy at 26w2d         Plan    Reviewed this stage of pregnancy  Problem list updated   Follow up in 3 weeks for her RhoGAM and treponemal antibody screen.    Lopez Kelly MD   2024  11:00 EDT

## 2024-08-17 LAB
GLUCOSE 1H P 100 G GLC PO SERPL-MCNC: 173 MG/DL (ref 70–179)
GLUCOSE 2H P 100 G GLC PO SERPL-MCNC: 160 MG/DL (ref 70–154)
GLUCOSE 3H P 100 G GLC PO SERPL-MCNC: 133 MG/DL (ref 70–139)
GLUCOSE P FAST SERPL-MCNC: 76 MG/DL (ref 70–94)
Lab: ABNORMAL

## 2024-08-19 ENCOUNTER — PATIENT OUTREACH (OUTPATIENT)
Dept: LABOR AND DELIVERY | Facility: HOSPITAL | Age: 34
End: 2024-08-19
Payer: COMMERCIAL

## 2024-08-19 NOTE — OUTREACH NOTE
[facility]  Check-In    Current Estimated Gestational Age: 26w5d      Questions/Answers      Flowsheet Row Responses   Best Method for Contacting Cell   Demographics Reviewed Yes   Currently Employed No   Able to keep appointments as scheduled Yes   Gender(s) and Name(s) girl   Baby Active/Feeling Fetal Movemen Yes   How are you presently feeling? good   Supplies ready for baby Car Seat   [employer]   Do you have any questions related to your care experience, your pregnancy, plans for delivery, any concerns, etc? No   Other Education [facility] Benefits, Insurance benefits/Incentives, How to find a pediatrician           [id number]. She is doing well and reports active fetal movement. We reviewed pediatricians and I sent her an office in her zip code. She requested the hospital packing list and I sent her this link today. She is also requesting assistance with food and diapers. I will send her info about the resource day at [facility] and also see if I can get an appt at her community [facility] for food. F/u around 35 weeks.     Elaine Obrien RN  Maternity Nurse Navigator    8/19/2024, 11:48 EDT

## 2024-09-13 ENCOUNTER — ROUTINE PRENATAL (OUTPATIENT)
Dept: OBSTETRICS AND GYNECOLOGY | Facility: CLINIC | Age: 34
End: 2024-09-13
Payer: COMMERCIAL

## 2024-09-13 VITALS — SYSTOLIC BLOOD PRESSURE: 122 MMHG | WEIGHT: 188.6 LBS | DIASTOLIC BLOOD PRESSURE: 85 MMHG

## 2024-09-13 DIAGNOSIS — Z3A.30 30 WEEKS GESTATION OF PREGNANCY: Primary | ICD-10-CM

## 2024-09-13 LAB
GLUCOSE UR STRIP-MCNC: NEGATIVE MG/DL
PROT UR STRIP-MCNC: ABNORMAL MG/DL

## 2024-09-13 RX ORDER — FAMOTIDINE 20 MG/1
20 TABLET, FILM COATED ORAL DAILY
Qty: 30 TABLET | Refills: 11 | Status: SHIPPED | OUTPATIENT
Start: 2024-09-13 | End: 2025-09-13

## 2024-09-16 LAB
A VAGINAE DNA VAG QL NAA+PROBE: ABNORMAL SCORE
BVAB2 DNA VAG QL NAA+PROBE: ABNORMAL SCORE
C ALBICANS DNA VAG QL NAA+PROBE: POSITIVE
C GLABRATA DNA VAG QL NAA+PROBE: NEGATIVE
C TRACH DNA SPEC QL NAA+PROBE: NEGATIVE
MEGA1 DNA VAG QL NAA+PROBE: ABNORMAL SCORE
N GONORRHOEA DNA VAG QL NAA+PROBE: NEGATIVE
T VAGINALIS DNA VAG QL NAA+PROBE: NEGATIVE

## 2024-09-17 ENCOUNTER — TELEPHONE (OUTPATIENT)
Dept: OBSTETRICS AND GYNECOLOGY | Facility: CLINIC | Age: 34
End: 2024-09-17
Payer: COMMERCIAL

## 2024-09-17 RX ORDER — TERCONAZOLE 0.4 %
1 CREAM WITH APPLICATOR VAGINAL NIGHTLY
Qty: 45 G | Refills: 0 | Status: SHIPPED | OUTPATIENT
Start: 2024-09-17

## 2024-09-26 ENCOUNTER — ROUTINE PRENATAL (OUTPATIENT)
Dept: OBSTETRICS AND GYNECOLOGY | Facility: CLINIC | Age: 34
End: 2024-09-26
Payer: COMMERCIAL

## 2024-09-26 VITALS — DIASTOLIC BLOOD PRESSURE: 78 MMHG | SYSTOLIC BLOOD PRESSURE: 118 MMHG | WEIGHT: 189.4 LBS

## 2024-09-26 DIAGNOSIS — Z3A.32 32 WEEKS GESTATION OF PREGNANCY: Primary | ICD-10-CM

## 2024-10-06 NOTE — PROGRESS NOTES
CC: Obstetric visit    HPI: 33-year-old  1 para 0 presents at 30-2/7 weeks for her obstetric visit.  Her baby is moving actively.  The patient is concerned about some vaginal discharge which is white.    Review of systems    This is positive for vaginal discharge.  Negative for fever or chills.  Negative for nausea or vomiting.    Physical examination    The abdomen is soft and gravid.  There is no epigastric tenderness.  No fundal tenderness.  No CVA tenderness.  No suprapubic tenderness.  Pelvic examination reveals normal female external genitalia.  The vagina is estrogenized.  There is a physiologic appearing discharge present.  The cervix is closed, thick and posterior.  Extremities are equal in size    Assessment and plan    1.  Intrauterine pregnancy at 30-2/7 weeks  2.  Vaginal discharge.  I counseled the patient and answered her questions.  While this appears physiologic on examination, cultures were performed.  Further recommendations pending the results of these cultures.  3.  The patient has passed her 3-hour glucose tolerance test.  4.  RhoGAM was given today along with the Tdap vaccine.   dx; Gastrointestinal hemmorhage

## 2024-10-10 ENCOUNTER — ROUTINE PRENATAL (OUTPATIENT)
Dept: OBSTETRICS AND GYNECOLOGY | Facility: CLINIC | Age: 34
End: 2024-10-10
Payer: COMMERCIAL

## 2024-10-10 VITALS — WEIGHT: 192 LBS | DIASTOLIC BLOOD PRESSURE: 78 MMHG | SYSTOLIC BLOOD PRESSURE: 118 MMHG

## 2024-10-10 DIAGNOSIS — Z3A.34 34 WEEKS GESTATION OF PREGNANCY: Primary | ICD-10-CM

## 2024-10-10 NOTE — PROGRESS NOTES
CC: Obstetric visit    HPI: 33-year-old  1 para 0 presents at 34 and 1 sevenths weeks for an obstetric visit.  Her baby is moving actively.  She has contractions approximately once or twice per day, but they are not regular.    Review of systems    This is negative for fever or chills.  Negative for nausea or vomiting.  Negative for vaginal bleeding.    Physical examination    The abdomen is soft and gravid.  There is no epigastric tenderness.  No fundal tenderness.  No CVA tenderness.  No suprapubic tenderness.  The patient declines pelvic examination today  Extremities are equal in size    Assessment and plan    1.  Intrauterine pregnancy at 34 and 1 sevenths weeks  2.  Rare contractions.   labor warnings given.  The patient declines pelvic examination today and I feel that this is appropriate.  3.  Counseled regarding vaccines.  The patient had Tdap vaccination recently.  We discussed the benefits and risks of influenza vaccine and RSV vaccine.  The patient would like to do both of these today.

## 2024-10-15 ENCOUNTER — PATIENT OUTREACH (OUTPATIENT)
Dept: LABOR AND DELIVERY | Facility: HOSPITAL | Age: 34
End: 2024-10-15
Payer: COMMERCIAL

## 2024-10-15 NOTE — OUTREACH NOTE
[facility]  Unable to Reach       Questions/Answers      Flowsheet Row Responses   Pending Outreach Prenatal Check-in   Call Attempt First   Outcome Not available   Next Call Attempt Date 10/16/24   Unable to reach comments:  [id number]          No Vm option     Callie S - RN  Maternity Nurse Navigator    10/15/2024, 15:24 EDT

## 2024-10-16 ENCOUNTER — PATIENT OUTREACH (OUTPATIENT)
Dept: LABOR AND DELIVERY | Facility: HOSPITAL | Age: 34
End: 2024-10-16
Payer: COMMERCIAL

## 2024-10-16 NOTE — OUTREACH NOTE
[facility]  Unable to Reach       Questions/Answers      Flowsheet Row Responses   Pending Outreach Prenatal Check-in   Call Attempt Second   Outcome Not available, AR LLCt message sent to patient           [id number], no VM option     Callie S - RN  Maternity Nurse Navigator    10/16/2024, 12:42 EDT

## 2024-10-21 ENCOUNTER — HOSPITAL ENCOUNTER (INPATIENT)
Facility: HOSPITAL | Age: 34
LOS: 2 days | Discharge: HOME | End: 2024-10-23
Attending: OBSTETRICS & GYNECOLOGY | Admitting: OBSTETRICS & GYNECOLOGY
Payer: COMMERCIAL

## 2024-10-21 ENCOUNTER — ANESTHESIA (OUTPATIENT)
Dept: LABOR AND DELIVERY | Facility: HOSPITAL | Age: 34
End: 2024-10-21
Payer: COMMERCIAL

## 2024-10-21 ENCOUNTER — ANESTHESIA EVENT (OUTPATIENT)
Dept: LABOR AND DELIVERY | Facility: HOSPITAL | Age: 34
End: 2024-10-21
Payer: COMMERCIAL

## 2024-10-21 PROBLEM — Z34.90 PREGNANCY: Status: RESOLVED | Noted: 2024-10-21 | Resolved: 2024-10-21

## 2024-10-21 PROBLEM — O42.919 PRETERM PREMATURE RUPTURE OF MEMBRANES (PPROM) WITH UNKNOWN ONSET OF LABOR: Status: ACTIVE | Noted: 2024-10-21

## 2024-10-21 PROBLEM — Z34.90 PREGNANCY: Status: ACTIVE | Noted: 2024-10-21

## 2024-10-21 LAB
ABO GROUP BLD: NORMAL
ALBUMIN SERPL-MCNC: 3.5 G/DL (ref 3.5–5.2)
ALBUMIN/GLOB SERPL: 1.2 G/DL
ALP SERPL-CCNC: 144 U/L (ref 39–117)
ALT SERPL W P-5'-P-CCNC: 18 U/L (ref 1–33)
ANION GAP SERPL CALCULATED.3IONS-SCNC: 10 MMOL/L (ref 5–15)
AST SERPL-CCNC: 17 U/L (ref 1–32)
BACTERIA UR QL AUTO: ABNORMAL /HPF
BASOPHILS # BLD AUTO: 0.05 10*3/MM3 (ref 0–0.2)
BASOPHILS NFR BLD AUTO: 0.3 % (ref 0–1.5)
BILIRUB SERPL-MCNC: <0.2 MG/DL (ref 0–1.2)
BILIRUB UR QL STRIP: NEGATIVE
BLD GP AB SCN SERPL QL: POSITIVE
BUN SERPL-MCNC: 6 MG/DL (ref 6–20)
BUN/CREAT SERPL: 15 (ref 7–25)
CALCIUM SPEC-SCNC: 9.7 MG/DL (ref 8.6–10.5)
CHLORIDE SERPL-SCNC: 105 MMOL/L (ref 98–107)
CLARITY UR: CLEAR
CO2 SERPL-SCNC: 24 MMOL/L (ref 22–29)
COLOR UR: YELLOW
CREAT SERPL-MCNC: 0.4 MG/DL (ref 0.57–1)
DEPRECATED RDW RBC AUTO: 43.1 FL (ref 37–54)
EGFRCR SERPLBLD CKD-EPI 2021: 135.1 ML/MIN/1.73
EOSINOPHIL # BLD AUTO: 0.14 10*3/MM3 (ref 0–0.4)
EOSINOPHIL NFR BLD AUTO: 1 % (ref 0.3–6.2)
ERYTHROCYTE [DISTWIDTH] IN BLOOD BY AUTOMATED COUNT: 13.3 % (ref 12.3–15.4)
GLOBULIN UR ELPH-MCNC: 3 GM/DL
GLUCOSE SERPL-MCNC: 87 MG/DL (ref 65–99)
GLUCOSE UR STRIP-MCNC: NEGATIVE MG/DL
HCT VFR BLD AUTO: 33.9 % (ref 34–46.6)
HGB BLD-MCNC: 11.6 G/DL (ref 12–15.9)
HGB UR QL STRIP.AUTO: ABNORMAL
HYALINE CASTS UR QL AUTO: ABNORMAL /LPF
IMM GRANULOCYTES # BLD AUTO: 0.21 10*3/MM3 (ref 0–0.05)
IMM GRANULOCYTES NFR BLD AUTO: 1.5 % (ref 0–0.5)
KETONES UR QL STRIP: NEGATIVE
LEUKOCYTE ESTERASE UR QL STRIP.AUTO: ABNORMAL
LYMPHOCYTES # BLD AUTO: 2.72 10*3/MM3 (ref 0.7–3.1)
LYMPHOCYTES NFR BLD AUTO: 18.9 % (ref 19.6–45.3)
MCH RBC QN AUTO: 30.6 PG (ref 26.6–33)
MCHC RBC AUTO-ENTMCNC: 34.2 G/DL (ref 31.5–35.7)
MCV RBC AUTO: 89.4 FL (ref 79–97)
MONOCYTES # BLD AUTO: 1.4 10*3/MM3 (ref 0.1–0.9)
MONOCYTES NFR BLD AUTO: 9.7 % (ref 5–12)
NEUTROPHILS NFR BLD AUTO: 68.6 % (ref 42.7–76)
NEUTROPHILS NFR BLD AUTO: 9.85 10*3/MM3 (ref 1.7–7)
NITRITE UR QL STRIP: NEGATIVE
NRBC BLD AUTO-RTO: 0 /100 WBC (ref 0–0.2)
PH UR STRIP.AUTO: 6.5 [PH] (ref 5–8)
PLATELET # BLD AUTO: 157 10*3/MM3 (ref 140–450)
PMV BLD AUTO: 11.9 FL (ref 6–12)
POTASSIUM SERPL-SCNC: 3.7 MMOL/L (ref 3.5–5.2)
PROT SERPL-MCNC: 6.5 G/DL (ref 6–8.5)
PROT UR QL STRIP: NEGATIVE
RBC # BLD AUTO: 3.79 10*6/MM3 (ref 3.77–5.28)
RBC # UR STRIP: ABNORMAL /HPF
REF LAB TEST METHOD: ABNORMAL
RESIDUAL RHIG DETECTED: NORMAL
RH BLD: NEGATIVE
SODIUM SERPL-SCNC: 139 MMOL/L (ref 136–145)
SP GR UR STRIP: 1.01 (ref 1–1.03)
SQUAMOUS #/AREA URNS HPF: ABNORMAL /HPF
T&S EXPIRATION DATE: NORMAL
TREPONEMA PALLIDUM IGG+IGM AB [PRESENCE] IN SERUM OR PLASMA BY IMMUNOASSAY: NORMAL
UROBILINOGEN UR QL STRIP: ABNORMAL
WBC # UR STRIP: ABNORMAL /HPF
WBC NRBC COR # BLD AUTO: 14.37 10*3/MM3 (ref 3.4–10.8)

## 2024-10-21 PROCEDURE — S0260 H&P FOR SURGERY: HCPCS | Performed by: OBSTETRICS & GYNECOLOGY

## 2024-10-21 PROCEDURE — 86780 TREPONEMA PALLIDUM: CPT | Performed by: OBSTETRICS & GYNECOLOGY

## 2024-10-21 PROCEDURE — 88307 TISSUE EXAM BY PATHOLOGIST: CPT

## 2024-10-21 PROCEDURE — C1755 CATHETER, INTRASPINAL: HCPCS | Performed by: ANESTHESIOLOGY

## 2024-10-21 PROCEDURE — 99202 OFFICE O/P NEW SF 15 MIN: CPT | Performed by: OBSTETRICS & GYNECOLOGY

## 2024-10-21 PROCEDURE — 0UQMXZZ REPAIR VULVA, EXTERNAL APPROACH: ICD-10-PCS | Performed by: STUDENT IN AN ORGANIZED HEALTH CARE EDUCATION/TRAINING PROGRAM

## 2024-10-21 PROCEDURE — 86850 RBC ANTIBODY SCREEN: CPT | Performed by: OBSTETRICS & GYNECOLOGY

## 2024-10-21 PROCEDURE — 25010000002 PENICILLIN G POTASSIUM PER 600000 UNITS: Performed by: OBSTETRICS & GYNECOLOGY

## 2024-10-21 PROCEDURE — 81001 URINALYSIS AUTO W/SCOPE: CPT | Performed by: OBSTETRICS & GYNECOLOGY

## 2024-10-21 PROCEDURE — 86901 BLOOD TYPING SEROLOGIC RH(D): CPT | Performed by: OBSTETRICS & GYNECOLOGY

## 2024-10-21 PROCEDURE — 59410 OBSTETRICAL CARE: CPT | Performed by: STUDENT IN AN ORGANIZED HEALTH CARE EDUCATION/TRAINING PROGRAM

## 2024-10-21 PROCEDURE — 86900 BLOOD TYPING SEROLOGIC ABO: CPT | Performed by: OBSTETRICS & GYNECOLOGY

## 2024-10-21 PROCEDURE — 80053 COMPREHEN METABOLIC PANEL: CPT | Performed by: OBSTETRICS & GYNECOLOGY

## 2024-10-21 PROCEDURE — 25810000003 LACTATED RINGERS PER 1000 ML: Performed by: OBSTETRICS & GYNECOLOGY

## 2024-10-21 PROCEDURE — 85025 COMPLETE CBC W/AUTO DIFF WBC: CPT | Performed by: OBSTETRICS & GYNECOLOGY

## 2024-10-21 PROCEDURE — 25810000003 LACTATED RINGERS SOLUTION: Performed by: ANESTHESIOLOGY

## 2024-10-21 PROCEDURE — 86870 RBC ANTIBODY IDENTIFICATION: CPT | Performed by: OBSTETRICS & GYNECOLOGY

## 2024-10-21 PROCEDURE — 0UQGXZZ REPAIR VAGINA, EXTERNAL APPROACH: ICD-10-PCS | Performed by: STUDENT IN AN ORGANIZED HEALTH CARE EDUCATION/TRAINING PROGRAM

## 2024-10-21 PROCEDURE — 25010000002 LIDOCAINE-EPINEPHRINE 1 %-1:200000 SOLUTION: Performed by: ANESTHESIOLOGY

## 2024-10-21 RX ORDER — DIPHENHYDRAMINE HCL 25 MG
25 CAPSULE ORAL NIGHTLY PRN
Status: DISCONTINUED | OUTPATIENT
Start: 2024-10-21 | End: 2024-10-23 | Stop reason: HOSPADM

## 2024-10-21 RX ORDER — OXYTOCIN/0.9 % SODIUM CHLORIDE 30/500 ML
125 PLASTIC BAG, INJECTION (ML) INTRAVENOUS ONCE AS NEEDED
Status: COMPLETED | OUTPATIENT
Start: 2024-10-21 | End: 2024-10-21

## 2024-10-21 RX ORDER — OXYTOCIN/0.9 % SODIUM CHLORIDE 30/500 ML
999 PLASTIC BAG, INJECTION (ML) INTRAVENOUS ONCE
Status: COMPLETED | OUTPATIENT
Start: 2024-10-21 | End: 2024-10-21

## 2024-10-21 RX ORDER — FAMOTIDINE 20 MG/1
20 TABLET, FILM COATED ORAL 2 TIMES DAILY PRN
Status: DISCONTINUED | OUTPATIENT
Start: 2024-10-21 | End: 2024-10-22 | Stop reason: HOSPADM

## 2024-10-21 RX ORDER — PRENATAL VIT/IRON FUM/FOLIC AC 27MG-0.8MG
1 TABLET ORAL DAILY
Status: DISCONTINUED | OUTPATIENT
Start: 2024-10-22 | End: 2024-10-23 | Stop reason: HOSPADM

## 2024-10-21 RX ORDER — PENICILLIN G 3000000 [IU]/50ML
3 INJECTION, SOLUTION INTRAVENOUS EVERY 4 HOURS
Status: DISCONTINUED | OUTPATIENT
Start: 2024-10-21 | End: 2024-10-22 | Stop reason: HOSPADM

## 2024-10-21 RX ORDER — CARBOPROST TROMETHAMINE 250 UG/ML
250 INJECTION, SOLUTION INTRAMUSCULAR
Status: DISCONTINUED | OUTPATIENT
Start: 2024-10-21 | End: 2024-10-22 | Stop reason: HOSPADM

## 2024-10-21 RX ORDER — ACETAMINOPHEN 325 MG/1
650 TABLET ORAL EVERY 4 HOURS PRN
Status: DISCONTINUED | OUTPATIENT
Start: 2024-10-21 | End: 2024-10-22 | Stop reason: HOSPADM

## 2024-10-21 RX ORDER — SODIUM CHLORIDE, SODIUM LACTATE, POTASSIUM CHLORIDE, CALCIUM CHLORIDE 600; 310; 30; 20 MG/100ML; MG/100ML; MG/100ML; MG/100ML
75 INJECTION, SOLUTION INTRAVENOUS CONTINUOUS
Status: DISCONTINUED | OUTPATIENT
Start: 2024-10-21 | End: 2024-10-22

## 2024-10-21 RX ORDER — HYDROCORTISONE 25 MG/G
1 CREAM TOPICAL AS NEEDED
Status: DISCONTINUED | OUTPATIENT
Start: 2024-10-21 | End: 2024-10-23 | Stop reason: HOSPADM

## 2024-10-21 RX ORDER — SODIUM CHLORIDE 0.9 % (FLUSH) 0.9 %
1-10 SYRINGE (ML) INJECTION AS NEEDED
Status: DISCONTINUED | OUTPATIENT
Start: 2024-10-21 | End: 2024-10-23 | Stop reason: HOSPADM

## 2024-10-21 RX ORDER — OXYTOCIN/0.9 % SODIUM CHLORIDE 30/500 ML
2-20 PLASTIC BAG, INJECTION (ML) INTRAVENOUS
Status: DISCONTINUED | OUTPATIENT
Start: 2024-10-21 | End: 2024-10-22

## 2024-10-21 RX ORDER — FAMOTIDINE 10 MG/ML
20 INJECTION, SOLUTION INTRAVENOUS 2 TIMES DAILY PRN
Status: DISCONTINUED | OUTPATIENT
Start: 2024-10-21 | End: 2024-10-22 | Stop reason: HOSPADM

## 2024-10-21 RX ORDER — ONDANSETRON 2 MG/ML
4 INJECTION INTRAMUSCULAR; INTRAVENOUS EVERY 6 HOURS PRN
Status: DISCONTINUED | OUTPATIENT
Start: 2024-10-21 | End: 2024-10-22 | Stop reason: HOSPADM

## 2024-10-21 RX ORDER — ACETAMINOPHEN 325 MG/1
650 TABLET ORAL EVERY 6 HOURS PRN
Status: DISCONTINUED | OUTPATIENT
Start: 2024-10-21 | End: 2024-10-23 | Stop reason: HOSPADM

## 2024-10-21 RX ORDER — BISACODYL 10 MG
10 SUPPOSITORY, RECTAL RECTAL DAILY PRN
Status: DISCONTINUED | OUTPATIENT
Start: 2024-10-22 | End: 2024-10-23 | Stop reason: HOSPADM

## 2024-10-21 RX ORDER — TERBUTALINE SULFATE 1 MG/ML
0.25 INJECTION, SOLUTION SUBCUTANEOUS AS NEEDED
Status: DISCONTINUED | OUTPATIENT
Start: 2024-10-21 | End: 2024-10-22 | Stop reason: HOSPADM

## 2024-10-21 RX ORDER — ONDANSETRON 4 MG/1
4 TABLET, ORALLY DISINTEGRATING ORAL EVERY 8 HOURS PRN
Status: DISCONTINUED | OUTPATIENT
Start: 2024-10-21 | End: 2024-10-23 | Stop reason: HOSPADM

## 2024-10-21 RX ORDER — SODIUM CHLORIDE 0.9 % (FLUSH) 0.9 %
10 SYRINGE (ML) INJECTION AS NEEDED
Status: DISCONTINUED | OUTPATIENT
Start: 2024-10-21 | End: 2024-10-22 | Stop reason: HOSPADM

## 2024-10-21 RX ORDER — SODIUM CHLORIDE 0.9 % (FLUSH) 0.9 %
10 SYRINGE (ML) INJECTION EVERY 12 HOURS SCHEDULED
Status: DISCONTINUED | OUTPATIENT
Start: 2024-10-21 | End: 2024-10-22 | Stop reason: HOSPADM

## 2024-10-21 RX ORDER — DOCUSATE SODIUM 100 MG/1
100 CAPSULE, LIQUID FILLED ORAL 2 TIMES DAILY
Status: DISCONTINUED | OUTPATIENT
Start: 2024-10-22 | End: 2024-10-23 | Stop reason: HOSPADM

## 2024-10-21 RX ORDER — MAGNESIUM CARB/ALUMINUM HYDROX 105-160MG
30 TABLET,CHEWABLE ORAL ONCE AS NEEDED
Status: DISCONTINUED | OUTPATIENT
Start: 2024-10-21 | End: 2024-10-22 | Stop reason: HOSPADM

## 2024-10-21 RX ORDER — LIDOCAINE HYDROCHLORIDE 10 MG/ML
0.5 INJECTION, SOLUTION INFILTRATION; PERINEURAL ONCE AS NEEDED
Status: DISCONTINUED | OUTPATIENT
Start: 2024-10-21 | End: 2024-10-22 | Stop reason: HOSPADM

## 2024-10-21 RX ORDER — FENTANYL/ROPIVACAINE/NS/PF 2MCG/ML-.2
10 PLASTIC BAG, INJECTION (ML) INJECTION CONTINUOUS
Status: DISCONTINUED | OUTPATIENT
Start: 2024-10-21 | End: 2024-10-22

## 2024-10-21 RX ORDER — NALOXONE HCL 0.4 MG/ML
0.4 VIAL (ML) INJECTION
Status: DISCONTINUED | OUTPATIENT
Start: 2024-10-21 | End: 2024-10-22 | Stop reason: HOSPADM

## 2024-10-21 RX ORDER — TRAMADOL HYDROCHLORIDE 50 MG/1
50 TABLET ORAL EVERY 6 HOURS PRN
Status: DISCONTINUED | OUTPATIENT
Start: 2024-10-21 | End: 2024-10-23 | Stop reason: HOSPADM

## 2024-10-21 RX ORDER — MISOPROSTOL 200 UG/1
800 TABLET ORAL ONCE AS NEEDED
Status: DISCONTINUED | OUTPATIENT
Start: 2024-10-21 | End: 2024-10-22 | Stop reason: HOSPADM

## 2024-10-21 RX ORDER — OXYTOCIN/0.9 % SODIUM CHLORIDE 30/500 ML
250 PLASTIC BAG, INJECTION (ML) INTRAVENOUS CONTINUOUS
Status: DISPENSED | OUTPATIENT
Start: 2024-10-21 | End: 2024-10-21

## 2024-10-21 RX ORDER — ONDANSETRON 4 MG/1
4 TABLET, ORALLY DISINTEGRATING ORAL EVERY 6 HOURS PRN
Status: DISCONTINUED | OUTPATIENT
Start: 2024-10-21 | End: 2024-10-22 | Stop reason: HOSPADM

## 2024-10-21 RX ORDER — ONDANSETRON 2 MG/ML
4 INJECTION INTRAMUSCULAR; INTRAVENOUS ONCE AS NEEDED
Status: DISCONTINUED | OUTPATIENT
Start: 2024-10-21 | End: 2024-10-22 | Stop reason: HOSPADM

## 2024-10-21 RX ORDER — MORPHINE SULFATE 2 MG/ML
1 INJECTION, SOLUTION INTRAMUSCULAR; INTRAVENOUS EVERY 4 HOURS PRN
Status: DISCONTINUED | OUTPATIENT
Start: 2024-10-21 | End: 2024-10-22 | Stop reason: HOSPADM

## 2024-10-21 RX ORDER — ONDANSETRON 2 MG/ML
4 INJECTION INTRAMUSCULAR; INTRAVENOUS EVERY 6 HOURS PRN
Status: DISCONTINUED | OUTPATIENT
Start: 2024-10-21 | End: 2024-10-23 | Stop reason: HOSPADM

## 2024-10-21 RX ORDER — EPHEDRINE SULFATE 50 MG/ML
5 INJECTION, SOLUTION INTRAVENOUS
Status: DISCONTINUED | OUTPATIENT
Start: 2024-10-21 | End: 2024-10-22 | Stop reason: HOSPADM

## 2024-10-21 RX ORDER — METHYLERGONOVINE MALEATE 0.2 MG/ML
200 INJECTION INTRAVENOUS ONCE AS NEEDED
Status: DISCONTINUED | OUTPATIENT
Start: 2024-10-21 | End: 2024-10-22 | Stop reason: HOSPADM

## 2024-10-21 RX ORDER — IBUPROFEN 600 MG/1
600 TABLET, FILM COATED ORAL EVERY 6 HOURS PRN
Status: DISCONTINUED | OUTPATIENT
Start: 2024-10-21 | End: 2024-10-23 | Stop reason: HOSPADM

## 2024-10-21 RX ORDER — SODIUM CHLORIDE 9 MG/ML
40 INJECTION, SOLUTION INTRAVENOUS AS NEEDED
Status: DISCONTINUED | OUTPATIENT
Start: 2024-10-21 | End: 2024-10-22 | Stop reason: HOSPADM

## 2024-10-21 RX ADMIN — Medication 10 ML/HR: at 20:24

## 2024-10-21 RX ADMIN — Medication 10 ML/HR: at 12:54

## 2024-10-21 RX ADMIN — LIDOCAINE HYDROCHLORIDE 3 ML: 10; .005 INJECTION, SOLUTION EPIDURAL; INFILTRATION; INTRACAUDAL; PERINEURAL at 12:47

## 2024-10-21 RX ADMIN — SODIUM CHLORIDE 5 MILLION UNITS: 900 INJECTION INTRAVENOUS at 03:00

## 2024-10-21 RX ADMIN — SODIUM CHLORIDE, POTASSIUM CHLORIDE, SODIUM LACTATE AND CALCIUM CHLORIDE 75 ML/HR: 600; 310; 30; 20 INJECTION, SOLUTION INTRAVENOUS at 02:54

## 2024-10-21 RX ADMIN — PENICILLIN G 3 MILLION UNITS: 3000000 INJECTION, SOLUTION INTRAVENOUS at 18:20

## 2024-10-21 RX ADMIN — Medication 2 MILLI-UNITS/MIN: at 08:00

## 2024-10-21 RX ADMIN — Medication 125 ML/HR: at 23:52

## 2024-10-21 RX ADMIN — PENICILLIN G 3 MILLION UNITS: 3000000 INJECTION, SOLUTION INTRAVENOUS at 14:32

## 2024-10-21 RX ADMIN — PENICILLIN G 3 MILLION UNITS: 3000000 INJECTION, SOLUTION INTRAVENOUS at 07:04

## 2024-10-21 RX ADMIN — SODIUM CHLORIDE, POTASSIUM CHLORIDE, SODIUM LACTATE AND CALCIUM CHLORIDE 75 ML/HR: 600; 310; 30; 20 INJECTION, SOLUTION INTRAVENOUS at 14:32

## 2024-10-21 RX ADMIN — SODIUM CHLORIDE, POTASSIUM CHLORIDE, SODIUM LACTATE AND CALCIUM CHLORIDE 1000 ML: 600; 310; 30; 20 INJECTION, SOLUTION INTRAVENOUS at 12:19

## 2024-10-21 RX ADMIN — Medication 999 ML/HR: at 22:32

## 2024-10-21 RX ADMIN — PENICILLIN G 3 MILLION UNITS: 3000000 INJECTION, SOLUTION INTRAVENOUS at 10:51

## 2024-10-21 NOTE — H&P
[facility]  Obstetric History and Physical    Chief Complaint   Patient presents with    Leaking Fluid     AMALIA- Pt reports having leaking fluid before arrival on unit. Pt had another gush while in the restroom in triage. Pt grossly ruptured upon cervical exam. Pt reports some cramping. Pt denies VB and endorses +FM       Subjective     Patient is a 33 y.o. female  currently at 35w5d, who presents with  with SROM around MN. She has noted irregular CTX since. She denies VB. FM is present. History obtained through [facility].     Prenatal care has been with Dr. Medrano.  It has been complicated by Rh negative status ..    Her prenatal care is benign.  Her previous obstetric/gynecological history is noted for is non-contributory.    The following portions of the patients history were reviewed and updated as appropriate: current medications, allergies, past medical history, past surgical history, past family history, past social history, and problem list .       Prenatal Information:  Prenatal Results       Initial Prenatal Labs       Test Value Reference Range Date Time    Hemoglobin  13.2 g/dL 11.1 - 15.9 24 0949    Hematocrit  40.2 % 34.0 - 46.6 24 0949    Platelets  182 x10E3/uL 150 - 450 24 0949    Rubella IgG  2.57 index Immune >0.99 24 0949    Hepatitis B SAg  Negative  Negative 24 0949    Hepatitis C Ab  Non Reactive  Non Reactive 24 0949    RPR  Non Reactive  Non Reactive 24 0949    T. Pallidum Ab   Non-Reactive  Non-Reactive 10/21/24 0251       Non Reactive  Non Reactive 24 1221    ABO  O   10/21/24 0251    Rh  Negative   10/21/24 0251    Antibody Screen  Negative  Negative 24 0949    HIV  Non Reactive  Non Reactive 24 0949    Urine Culture  Final report   24 1216       Final report   24 1152    Gonorrhea  Negative  Negative 24 1251       Negative  Negative 24 0946       Negative  Negative 24 1155     Chlamydia  Negative  Negative 09/13/24 1251       Negative  Negative 04/30/24 0946       Negative  Negative 04/02/24 1155    TSH        HgB A1c         Varicella IgG        Hemoglobinopathy Fractionation        Hemoglobinopathy (genetic testing)        Cystic fibrosis                   Fetal testing        Test Value Reference Range Date Time    NIPT        MSAFP  *Screen Negative*   06/25/24 1148    AFP-4                  2nd and 3rd Trimester       Test Value Reference Range Date Time    Hemoglobin (repeated)  11.6 g/dL 12.0 - 15.9 10/21/24 0251    Hematocrit (repeated)  33.9 % 34.0 - 46.6 10/21/24 0251    Platelets   157 10*3/mm3 140 - 450 10/21/24 0251       182 x10E3/uL 150 - 450 04/02/24 0949    1 hour GTT   153 mg/dL 70 - 139 08/09/24 1221    Antibody Screen (repeated)  Positive   10/21/24 0251       Negative  Negative 08/09/24 1221    3rd TM syphilis scrn (repeated)  RPR         3rd TM syphilis scrn (repeated) TP-Ab  Non-Reactive  Non-Reactive 10/21/24 0251       Non Reactive  Non Reactive 08/09/24 1221    3rd TM syphilis screen TB-Ab (FTA)  Non-Reactive  Non-Reactive 10/21/24 0251       Non Reactive  Non Reactive 08/09/24 1221    Syphilis cascade test TP-Ab (EIA)        Syphilis cascade TPPA        GTT Fasting  76 mg/dL 70 - 94 08/16/24 0942    GTT 1 Hr  173 mg/dL 70 - 179 08/16/24 0942    GTT 2 Hr  160 mg/dL 70 - 154 08/16/24 0942    GTT 3 Hr  133 mg/dL 70 - 139 08/16/24 0942    Group B Strep                  Other testing        Test Value Reference Range Date Time    Parvo IgG         CMV IgG                   Drug Screening       Test Value Reference Range Date Time    Amphetamine Screen  Negative ng/mL Vbczpf=7390 04/02/24 1152    Barbiturate Screen  Negative ng/mL Gtmajk=914 04/02/24 1152    Benzodiazepine Screen  Negative ng/mL Ttocwc=647 04/02/24 1152    Methadone Screen  Negative ng/mL Gshfwu=852 04/02/24 1152    Phencyclidine Screen  Negative ng/mL Cutoff=25 04/02/24 1152    Opiates Screen   Negative ng/mL Qyakus=301 04/02/24 1152    THC Screen  Negative ng/mL Cutoff=50 04/02/24 1152    Cocaine Screen  Negative ng/mL Zgzwoh=318 04/02/24 1152    Propoxyphene Screen  Negative ng/mL Qzhyiw=319 04/02/24 1152    Buprenorphine Screen        Methamphetamine Screen        Oxycodone Screen        Tricyclic Antidepressants Screen                  Legend    ^: Historical                          External Prenatal Results       Pregnancy Outside Results - Transcribed From Office Records - See Scanned Records For Details       Test Value Date Time    ABO  O  10/21/24 0251    Rh  Negative  10/21/24 0251    Antibody Screen  Positive  10/21/24 0251       Negative  08/09/24 1221       Negative  04/02/24 0949    Varicella IgG       Rubella  2.57 index 04/02/24 0949    Hgb  11.6 g/dL 10/21/24 0251       13.2 g/dL 04/02/24 0949    Hct  33.9 % 10/21/24 0251       40.2 % 04/02/24 0949    HgB A1c        1h GTT  153 mg/dL 08/09/24 1221    3h GTT Fasting  76 mg/dL 08/16/24 0942    3h GTT 1 hour  173 mg/dL 08/16/24 0942    3h GTT 2 hour  160 mg/dL 08/16/24 0942    3h GTT 3 hour   133 mg/dL 08/16/24 0942    Gonorrhea (discrete)  Negative  09/13/24 1251       Negative  04/30/24 0946       Negative  04/02/24 1155    Chlamydia (discrete)  Negative  09/13/24 1251       Negative  04/30/24 0946       Negative  04/02/24 1155    RPR  Non Reactive  04/02/24 0949    Syphils cascade: TP-Ab (FTA)  Non-Reactive  10/21/24 0251       Non Reactive  08/09/24 1221    TP-Ab  Non-Reactive  10/21/24 0251       Non Reactive  08/09/24 1221    TP-Ab (EIA)       TPPA       HBsAg  Negative  04/02/24 0949    Herpes Simplex Virus PCR       Herpes Simplex VIrus Culture       HIV  Non Reactive  04/02/24 0949    Hep C RNA Quant PCR       Hep C Antibody  Non Reactive  04/02/24 0949    AFP  55.4 ng/mL 06/25/24 1148    NIPT       Cystic Fibroisis        Group B Strep       GBS Susceptibility to Clindamycin       GBS Susceptibility to Erythromycin       Fetal  Fibronectin       Genetic Testing, Maternal Blood                 Drug Screening       Test Value Date Time    Urine Drug Screen       Amphetamine Screen  Negative ng/mL 24 1152    Barbiturate Screen  Negative ng/mL 24 1152    Benzodiazepine Screen  Negative ng/mL 24 1152    Methadone Screen  Negative ng/mL 24 1152    Phencyclidine Screen  Negative ng/mL 24 1152    Opiates Screen       THC Screen       Cocaine Screen       Propoxyphene Screen  Negative ng/mL 24 1152    Buprenorphine Screen       Methamphetamine Screen       Oxycodone Screen       Tricyclic Antidepressants Screen                 Legend    ^: Historical                             Past OB History:     OB History    Para Term  AB Living   1 0 0 0 0 0   SAB IAB Ectopic Molar Multiple Live Births   0 0 0 0 0 0      # Outcome Date GA Lbr Cesar/2nd Weight Sex Type Anes PTL Lv   1 Current                Past Medical History: History reviewed. No pertinent past medical history.   Past Surgical History Past Surgical History:   Procedure Laterality Date    OVARY BIOPSY        Family History: History reviewed. No pertinent family history.   Social History:  reports that she has never smoked. She has never used smokeless tobacco.   reports no history of alcohol use.   reports no history of drug use.        General ROS: Pertinent items are noted in HPI, all other systems reviewed and negative    Objective       Vital Signs Range for the last 24 hours  Temperature: Temp:  [97.8 °F (36.6 °C)-98.7 °F (37.1 °C)] 97.9 °F (36.6 °C)   Temp Source: Temp src: Oral   BP: BP: (125-143)/(65-87) 134/87   Pulse: Heart Rate:  [] 95   Respirations: Resp:  [16-17] 16   SPO2: SpO2:  [97 %] 97 %   O2 Amount (l/min):     O2 Devices Device (Oxygen Therapy): room air   Weight: Weight:  [89.8 kg (198 lb)] 89.8 kg (198 lb)     Physical Examination: General appearance - alert, well appearing, and in no distress and oriented to person,  "place, and time  Heart - normal rate, regular rhythm, normal S1, S2, no murmurs, rubs, clicks or gallops  Abdomen - soft, nontender, nondistended, no masses or organomegaly  Gravid with fundal height consistent with 35 weeks size  Extremities - peripheral pulses normal, no pedal edema, no clubbing or cyanosis    Presentation: Cephalic per OB ED physician   Cervix: Exam by: Method: sterile vaginal exam performed   Dilation: Cervical Dilation (cm): 1   Effacement: Cervical Effacement: 60   Station:         Fetal Heart Rate Assessment   Method: Fetal HR Assessment Method: external   Beats/min: Fetal HR (beats/min): 130   Baseline: Fetal HR Baseline: normal range   Variability: Fetal HR Variability: minimal (detectable, amplitude less than or equal to 5 bpm)   Accels: Fetal HR Accelerations: greater than/equal to 15 bpm, lasting at least 15 seconds   Decels: Fetal HR Decelerations: absent   Tracing Category:       Uterine Assessment   Method: Method: external tocotransducer   Frequency (min): Contraction Frequency (Minutes): 1-4   Ctx Count in 10 min:     Duration:     Intensity: Contraction Intensity: moderate by palpation   Intensity by IUPC:     Resting Tone: Uterine Resting Tone: soft by palpation   Resting Tone by IUPC:     [place] Units:       Laboratory Results: Cephalic  Radiology Review: .  Other Studies: .    Assessment & Plan       Pregnancy        Assessment:  1.  Intrauterine pregnancy at 35w5d gestation with reactive, reassuring fetal status.    2.  PPROM  without chorioamnionitis  3.  Obstetrical history significant for is non-contributory.  4.  GBS status: No results found for: \"STREPGPB\"    Plan:  1. Vaginal anticipated, fetal and uterine monitoring  continuously, labor augmentation  Pitocin, analgesia with  epidural, and antibiotic for GBS  2. Plan of care has been reviewed with patient and she agrees.  3.  Risks, benefits of treatment plan have been discussed.  4.  All questions have been " answered.  5.  .      Lopez Kelly MD  10/21/2024  09:33 EDT

## 2024-10-21 NOTE — ANESTHESIA PROCEDURE NOTES
Labor Epidural      Patient reassessed immediately prior to procedure    Patient location during procedure: OB  Start Time: 10/21/2024 12:42 PM  Stop Time: 10/21/2024 12:47 PM  Performed By  Anesthesiologist: Lizzie Newell MD  Preanesthetic Checklist  Completed: patient identified, IV checked, site marked, risks and benefits discussed, surgical consent, monitors and equipment checked, pre-op evaluation and timeout performed  Prep:  Pt Position:sitting  Sterile Tech:cap, gloves and mask  Prep:chlorhexidine gluconate and isopropyl alcohol  Monitoring:blood pressure monitoring and continuous pulse oximetry  Epidural Block Procedure:  Approach:midline  Guidance:landmark technique and palpation technique  Location:L3-L4  Needle Type:Tuohy  Needle Gauge:19 G  Loss of Resistance Medium: air (Mixed air and saline)  Loss of Resistance: 7cm  Cath Depth at skin:12 cm  Paresthesia: none  Aspiration:negative  Test Dose:negative  Number of Attempts: 1  Post Assessment:  Dressing:occlusive dressing applied and secured with tape  Pt Tolerance:patient tolerated the procedure well with no apparent complications  Complications:no

## 2024-10-21 NOTE — OBED NOTES
[facility]  Latha Dawson  : 1990  MRN: [id number]  CSN: [id number]    OB ED Provider Note    Subjective   Chief Complaint   Patient presents with    Leaking Fluid     AMALIA- Pt reports having leaking fluid before arrival on unit. Pt had another gush while in the restroom in triage. Pt grossly ruptured upon cervical exam. Pt reports some cramping. Pt denies VB and endorses +FM     Latha Dawson is a 33 y.o. year old  with an Estimated Date of Delivery: 24 currently at 35w5d presenting with SROM around MN. She has noted irregular CTX since. She denies VB. FM is present. History obtained through [facility].    Prenatal care has been with Dr. Medrano.  It has been complicated by Rh negative status .    OB History    Para Term  AB Living   1 0 0 0 0 0   SAB IAB Ectopic Molar Multiple Live Births   0 0 0 0 0 0      # Outcome Date GA Lbr Cesar/2nd Weight Sex Type Anes PTL Lv   1 Current              No past medical history on file.  Past Surgical History:   Procedure Laterality Date    OVARY BIOPSY       No current facility-administered medications for this encounter.    No Known Allergies  Social History    Tobacco Use      Smoking status: Never      Smokeless tobacco: Never    Review of Systems   Genitourinary:  Positive for vaginal discharge.   All other systems reviewed and are negative.        Objective   /81 (BP Location: Right arm, Patient Position: Lying)   Pulse 106   Temp 98 °F (36.7 °C) (Oral)   Resp 16   Wt 89.8 kg (198 lb)   LMP 2024   General: well developed; well nourished  no acute distress  mentation appropriate   Abdomen: soft, non-tender; no masses  gravid    FHT's: reactive and category 1      Cervix: was checked (by RN): 1 cm / 60 % / -3 grossly ruptured   Presentation: cephalic   Contractions: every 3-9 minutes   Chest: Unlabored respirations    CV:  Mild tachycardia, RR   Ext:   No C/C/E   Back: CVA tenderness is  deferred bilateral        Prenatal Labs  Lab Results   Component Value Date    HGB 13.2 04/02/2024    RUBELLAABIGG 2.57 04/02/2024    HEPBSAG Negative 04/02/2024    ABSCRN Negative 08/09/2024    XEX6YAW9 Non Reactive 04/02/2024    HEPCVIRUSABY Non Reactive 04/02/2024     (H) 08/09/2024    GGTFASTING 76 08/16/2024    CNH9WHIB 173 08/16/2024    MIF4WBFY 160 (H) 08/16/2024    HTJ6WTVC 133 08/16/2024    URINECX Final report 05/28/2024    CHLAMNAA Negative 09/13/2024    NGONORRHON Negative 09/13/2024       Current Labs Reviewed   UA:    Lab Results   Component Value Date    SQUAMEPIUA 3-6 (A) 10/21/2024    SPECGRAVUR 1.015 10/21/2024    KETONESU Negative 10/21/2024    BLOODU Small (1+) (A) 10/21/2024    LEUKOCYTESUR Moderate (2+) (A) 10/21/2024    NITRITEU Negative 10/21/2024    RBCUA 11-20 (A) 10/21/2024    WBCUA 6-10 (A) 10/21/2024    BACTERIA None Seen 10/21/2024          Assessment   IUP at 35w5d  PPROM- unknown GBS status. She would like an epidural      Plan   Admit to L&D for GBS prophylaxis, expectant management. Dr. Sánchez notified and is assuming management.    Marleny Peck MD  10/21/2024  01:45 EDT

## 2024-10-21 NOTE — PLAN OF CARE
Goal Outcome Evaluation:  Plan of Care Reviewed With: patient        Progress: no change  Outcome Evaluation: Pt admitted for SROM with clear fluid at 35 weeks and 5 days. GBS unknown. Plan is to administer two doses of PCN then initiate Pitocin. VSS. pain 5/10, declines epidural at this time. No needs or complaints identified throughout the shift.

## 2024-10-21 NOTE — ANESTHESIA PREPROCEDURE EVALUATION
Anesthesia Evaluation                  Airway   Mallampati: II  TM distance: >3 FB  Neck ROM: full  Dental      Pulmonary    Cardiovascular   Exercise tolerance: good (4-7 METS)    Rhythm: regular  Rate: normal        Neuro/Psych  GI/Hepatic/Renal/Endo      Musculoskeletal     Abdominal    Substance History      OB/GYN    (+) Pregnant        Other                    Anesthesia Plan    ASA 2     epidural     (IUP at 35w5d for labor epidural )    Anesthetic plan, risks, benefits, and alternatives have been provided, discussed and informed consent has been obtained with: patient.    CODE STATUS:    Level Of Support Discussed With: Patient  Code Status (Patient has no pulse and is not breathing): CPR (Attempt to Resuscitate)  Medical Interventions (Patient has pulse or is breathing): Full Support

## 2024-10-21 NOTE — PLAN OF CARE
Problem: Adult Inpatient Plan of Care  Goal: Plan of Care Review  Outcome: Progressing  Flowsheets (Taken 10/21/2024 1851)  Progress: improving  Outcome Evaluation: Pt admitted for SROM, started on pitocin, epiduralized, VSS, RNST, planning for vaginal delivery. Will continue to titrate pitocin per protocol, giving pencillin for GBS Q4 hours.  Plan of Care Reviewed With:   patient   spouse   family  Goal: Patient-Specific Goal (Individualized)  Outcome: Progressing  Flowsheets (Taken 10/21/2024 1851)  Patient/Family-Specific Goals (Include Timeframe): Vaginal delivery and healthy mom/baby.  Individualized Care Needs:  needed and education  Anxieties, Fears or Concerns: Fears associated with first baby.  Goal: Absence of Hospital-Acquired Illness or Injury  Outcome: Progressing  Intervention: Identify and Manage Fall Risk  Recent Flowsheet Documentation  Taken 10/21/2024 1320 by Erick García RN  Safety Promotion/Fall Prevention: safety round/check completed  Taken 10/21/2024 0720 by Erick García RN  Safety Promotion/Fall Prevention: safety round/check completed  Goal: Optimal Comfort and Wellbeing  Outcome: Progressing  Intervention: Provide Person-Centered Care  Recent Flowsheet Documentation  Taken 10/21/2024 1320 by Erick García RN  Trust Relationship/Rapport:   care explained   choices provided   emotional support provided   empathic listening provided   questions answered   questions encouraged   reassurance provided   thoughts/feelings acknowledged  Taken 10/21/2024 0720 by Erick García RN  Trust Relationship/Rapport:   care explained   choices provided   emotional support provided   empathic listening provided   questions answered   questions encouraged   reassurance provided   thoughts/feelings acknowledged  Goal: Readiness for Transition of Care  Outcome: Progressing     Problem: Labor  Goal: Hemostasis  Outcome: Progressing  Goal: Stable Fetal Wellbeing  Outcome: Progressing  Goal:  Effective Progression to Delivery  Outcome: Progressing  Goal: Absence of Infection Signs and Symptoms  Outcome: Progressing  Goal: Acceptable Pain Control  Outcome: Progressing  Goal: Normal Uterine Contraction Pattern  Outcome: Progressing     Problem:  Labor  Goal: Delayed  Birth  Outcome: Progressing     Problem: Skin Injury Risk Increased  Goal: Skin Health and Integrity  Outcome: Progressing   Goal Outcome Evaluation:  Plan of Care Reviewed With: patient, spouse, family        Progress: improving  Outcome Evaluation: Pt admitted for SROM, started on pitocin, epiduralized, VSS, RNST, planning for vaginal delivery. Will continue to titrate pitocin per protocol, giving pencillin for GBS Q4 hours.

## 2024-10-22 ENCOUNTER — PATIENT OUTREACH (OUTPATIENT)
Dept: LABOR AND DELIVERY | Facility: HOSPITAL | Age: 34
End: 2024-10-22
Payer: COMMERCIAL

## 2024-10-22 LAB
ABO GROUP BLD: NORMAL
BASOPHILS # BLD AUTO: 0.04 10*3/MM3 (ref 0–0.2)
BASOPHILS NFR BLD AUTO: 0.2 % (ref 0–1.5)
DEPRECATED RDW RBC AUTO: 41.5 FL (ref 37–54)
EOSINOPHIL # BLD AUTO: 0.02 10*3/MM3 (ref 0–0.4)
EOSINOPHIL NFR BLD AUTO: 0.1 % (ref 0.3–6.2)
ERYTHROCYTE [DISTWIDTH] IN BLOOD BY AUTOMATED COUNT: 13.1 % (ref 12.3–15.4)
FETAL BLEED: NEGATIVE
HCT VFR BLD AUTO: 34.3 % (ref 34–46.6)
HGB BLD-MCNC: 11.4 G/DL (ref 12–15.9)
IMM GRANULOCYTES # BLD AUTO: 0.19 10*3/MM3 (ref 0–0.05)
IMM GRANULOCYTES NFR BLD AUTO: 1 % (ref 0–0.5)
LYMPHOCYTES # BLD AUTO: 2.64 10*3/MM3 (ref 0.7–3.1)
LYMPHOCYTES NFR BLD AUTO: 14.4 % (ref 19.6–45.3)
MCH RBC QN AUTO: 29.5 PG (ref 26.6–33)
MCHC RBC AUTO-ENTMCNC: 33.2 G/DL (ref 31.5–35.7)
MCV RBC AUTO: 88.6 FL (ref 79–97)
MONOCYTES # BLD AUTO: 1.59 10*3/MM3 (ref 0.1–0.9)
MONOCYTES NFR BLD AUTO: 8.7 % (ref 5–12)
NEUTROPHILS NFR BLD AUTO: 13.81 10*3/MM3 (ref 1.7–7)
NEUTROPHILS NFR BLD AUTO: 75.6 % (ref 42.7–76)
NRBC BLD AUTO-RTO: 0 /100 WBC (ref 0–0.2)
NUMBER OF DOSES: NORMAL
PLATELET # BLD AUTO: 164 10*3/MM3 (ref 140–450)
PMV BLD AUTO: 11.6 FL (ref 6–12)
RBC # BLD AUTO: 3.87 10*6/MM3 (ref 3.77–5.28)
RH BLD: NEGATIVE
WBC NRBC COR # BLD AUTO: 18.29 10*3/MM3 (ref 3.4–10.8)

## 2024-10-22 PROCEDURE — 85461 HEMOGLOBIN FETAL: CPT | Performed by: OBSTETRICS & GYNECOLOGY

## 2024-10-22 PROCEDURE — 86900 BLOOD TYPING SEROLOGIC ABO: CPT | Performed by: OBSTETRICS & GYNECOLOGY

## 2024-10-22 PROCEDURE — 86901 BLOOD TYPING SEROLOGIC RH(D): CPT | Performed by: OBSTETRICS & GYNECOLOGY

## 2024-10-22 PROCEDURE — 0503F POSTPARTUM CARE VISIT: CPT | Performed by: NURSE PRACTITIONER

## 2024-10-22 PROCEDURE — 85025 COMPLETE CBC W/AUTO DIFF WBC: CPT | Performed by: STUDENT IN AN ORGANIZED HEALTH CARE EDUCATION/TRAINING PROGRAM

## 2024-10-22 PROCEDURE — 25010000002 RHO D IMMUNE GLOBULIN 1500 UNIT/2ML SOLUTION PREFILLED SYRINGE: Performed by: OBSTETRICS & GYNECOLOGY

## 2024-10-22 RX ORDER — TRANEXAMIC ACID 10 MG/ML
1000 INJECTION, SOLUTION INTRAVENOUS ONCE AS NEEDED
Status: DISCONTINUED | OUTPATIENT
Start: 2024-10-22 | End: 2024-10-23 | Stop reason: HOSPADM

## 2024-10-22 RX ADMIN — DOCUSATE SODIUM 100 MG: 100 CAPSULE, LIQUID FILLED ORAL at 21:01

## 2024-10-22 RX ADMIN — HUMAN RHO(D) IMMUNE GLOBULIN 1500 UNITS: 1500 SOLUTION INTRAMUSCULAR; INTRAVENOUS at 07:02

## 2024-10-22 RX ADMIN — IBUPROFEN 600 MG: 600 TABLET, FILM COATED ORAL at 16:08

## 2024-10-22 RX ADMIN — IBUPROFEN 600 MG: 600 TABLET, FILM COATED ORAL at 08:00

## 2024-10-22 RX ADMIN — DOCUSATE SODIUM 100 MG: 100 CAPSULE, LIQUID FILLED ORAL at 02:46

## 2024-10-22 RX ADMIN — DOCUSATE SODIUM 100 MG: 100 CAPSULE, LIQUID FILLED ORAL at 08:00

## 2024-10-22 RX ADMIN — IBUPROFEN 600 MG: 600 TABLET, FILM COATED ORAL at 00:12

## 2024-10-22 RX ADMIN — Medication: at 02:46

## 2024-10-22 RX ADMIN — Medication 1 TABLET: at 08:00

## 2024-10-22 RX ADMIN — IBUPROFEN 600 MG: 600 TABLET, FILM COATED ORAL at 22:38

## 2024-10-22 NOTE — PROGRESS NOTES
VAGINAL DELIVERY POSTPARTUM DAY 2    10/22/2024  PATIENT: Latha Dawson        MR#:[id number]  LOC[facility]  DATE OF ADMISSION: 10/21/2024  ADMISSION  DIAGNOSIS:    (spontaneous vaginal delivery)     premature rupture of membranes (PPROM) with unknown onset of labor     CURRENT DIAGNOSIS: No notes have been filed under this hospital service.  Service: Hospitalist    SERVICE: Obstetrics       (spontaneous vaginal delivery)     premature rupture of membranes (PPROM) with unknown onset of labor        PROCEDURES:  Vaginal, Spontaneous    10/21/2024   10:29 PM     ASSESSMENT/PLAN  Status Post Vaginal Delivery Day 2:   Postpartum care immediately following vaginal delivery:  Doing well. Meeting all milestones for discharge.   Hgb: 11.4 on postpartum.   Contraception management: Patient desires depo injection prior to discharge.   Back spasm: Prescription provided for cyclobenzaprine 5 mg every 8 hours as needed for muscular spasms in back.   Discharge to home: Discharge instructions given, precautions reviewed. Follow up with [facility]  in 4 to 6 weeks for routine postpartum visit. Prescription for ibuprofen 600mg PO every 6 hours PRN for pain, docusate 100mg PO BID, and cyclobenzaprine 5 mg every 8 hours as needed.  Advised no tampons, menstrual cups, intercourse, or tub baths for 6 weeks.     RH STATUS: O negative   SYPHILIS SCREEN DELIVERY ADMIT: treponemal antibody non-reactive upon admission  RUBELLA: immune  VARICELLA: unknown immunity  INFANT GENDER: female      Subjective    Latha feels well.  Patient describes her lochia as less than menses.  Pain is well controlled.  Latha is passing flatus and ambulating well.      Objective   Temp: Temp:  [97.9 °F (36.6 °C)-98.9 °F (37.2 °C)] 97.9 °F (36.6 °C) Temp src: Oral   BP: BP: (107-146)/() 123/79        Pulse: Heart Rate:  [] 90  RR: Resp:  [16] 16    General:  Awake, alert, no acute distress   Cardiac: Regular  rate and rhythm    Respiratory: Lungs clear bilaterally, normal respiratory effort    Abdomen: Soft, non-distended, fundus firm, below umbilicus, appropriately tender   Pelvis: deferred   Extremities: Calves NT bilaterally, DTR 2+, no clonus noted, trace edema     I/O last 3 completed shifts:  In: 4018 [I.V.:3722.6; IV Piggyback:295.4]  Out: 1163 [Urine:1100; Blood:63]  Lab Results   Component Value Date    WBC 18.29 (H) 10/22/2024    HGB 11.4 (L) 10/22/2024    HCT 34.3 10/22/2024    MCV 88.6 10/22/2024     10/22/2024    CREATININE 0.40 (L) 10/21/2024    AST 17 10/21/2024    ALT 18 10/21/2024    HEPBSAG Negative 04/02/2024     Results from last 7 days   Lab Units 10/22/24  0011 10/21/24  0251   ABO TYPING  O O   RH TYPING  Negative Negative   ANTIBODY SCREEN   --  Positive       Destinee Sierra CNM  14:37 EDT  October 22, 2024

## 2024-10-22 NOTE — LACTATION NOTE
Baby out from NICU.BGM 56 Attempted first bf with 24 mm NS but no feeding cues present and baby has a weak suck. Attempted formula but she continued to show no interest and only sucked a few times. Swaddled and placed upright on mom with instructions to watch for feeding cues and give formula 10-15  ml if present. If after 0630 get a new bottle and nipple.       Reviewed bf education: ways to wake baby- STS, suck training, stimulation, HE colostrum.  Attempt feeding every 2-3 hours from start of last feeding and when baby is alert, feeding cues present. Call for bgm before feeds until told.   Normal 35/5 week behavior including sleepiness- HE or pump and give ebm if no latch achieved. Pump after feeds with HGP.   Proper way to position and steps for an effective latch with NS. Clean NS and pump parts after each use.  Weight and output expectations.  Infant stomach size  Nipple care. Lanolin given with instructions.  Review Postpartum handbook pages 35-45, [facility] information.  Call  for any assistance. # on WB.    ID: [id number]

## 2024-10-22 NOTE — L&D DELIVERY NOTE
[facility]   Vaginal Delivery Note    Patient Name: Latha Dawson  : 1990  MRN: [id number]    Date of Delivery: 10/21/2024    Diagnosis     Pre & Post-Delivery:  Intrauterine pregnancy at 35w5d  Labor status: Spontaneous Onset of Labor  PPROM without chorioamnionitis   Rh negative status     (spontaneous vaginal delivery)                 Problem List    Transfer to Postpartum     Review the Delivery Report for details.     Delivery     Delivery: Vaginal, Spontaneous    YOB: 2023   Time of Birth:  Gestational Age 10:29 PM  35w5d     Anesthesia: Epidural    Delivering clinician: Monique Herman   Forceps?   No   Vacuum? No    Shoulder dystocia present: No        Delivery narrative:        Procedure: Spontaneous vaginal delivery    Surgeon: Monique Herman MD    Preop diagnosis:  33 y.o.  year old  in active labor at 35w5d with pregnancy complicated by PPROM, Rh negative status        Postop diagnosis: Same    Indications: Latha Dawson is a 33 y.o.  at 35w5d who presented to L&D with rupture of membranes around midnight last night. She had irregular contractions and no vaginal bleeding. She was noted to be grossly ruptured and noted to be 1/60%. She was thus admitted for PPROM and started on pitocin augmentation. She continued to progress and had an epidural placed for pain control. She continued  to progress to complete dilation at . She then labored down with pitocin turned off while I finished delivering another patient. She then pushed for just under an hour to deliver her infant.       Findings: Female infant, Apgar 8/9, Weight 3433 g (7 lb 9.1 oz); bilateral vaginal and left periurethral lacerations noted and repaired    Anesthesia: Epidural    QBL: 63 cc    Placenta: Delivered spontaneously intact and sent for pathology     Cord gases: n/a     Complications: None    Procedure:The cervix was noted to be completely dilated, completely effaced, and +2  station. Under continuous fetal heart rate monitoring, the patient was encouraged to push. With good maternal effort she delivered a viable female infant. The head presented in the OA presentation and restituted to THOR. There was a loose nuchal cord present that was reduced at the perineum. The left anterior fetal shoulder was then easily delivered with a gentle downward motion followed by the posterior fetal shoulder, followed by the remainder of the infant without difficulty. The infant was immediately vigorous. The cord was clamped roughly 60 seconds following delivery. The cord was cut and the infant was taken to the [facility] for evaluation by NICU provider. Cord blood was then collected. The placenta then delivered spontaneously intact, and a three vessel cord was noted. Uterine message and pitocin 20 units IV was given until the fundus was firm. The cervix, vagina, perineum, and rectum were carefully inspected for lacerations and small < 0.5 cm bilateral vaginal lacerations noted on the posterior vagina and a left periurethral lacerations noted. The periurethral laceration was repaired in running locked fashion with 2-0 Vicryl to achieve hemostasis. Figure of eight suture was then placed in the right vaginal laceration to achieve hemostasis. Lastly, the left vaginal laceration was repaired with 2-0 Vicryl in running fashion to achieve hemostasis. Counts for needles, sponges, laps and instruments were correct times two at the end of the delivery. There were no sponges left in the vagina. I was present and scrubbed for the entire delivery. There were no major complications. Baby had to be transferred to the NICU for respiratory distress following delivery. Mother was doing well.     Infant     Findings: female infant     Infant observations: Weight: 3433 g (7 lb 9.1 oz)  Length: 20.5 in  Observations/Comments:  Infant scale #4     Apgars: 8  @ 1 minute /    9  @ 5 minutes     Placenta & Cord         Placenta  "delivered  Spontaneous at   10/21/2024 10:32 PM    Cord: 3 vessels present.   Nuchal Cord?  yes; Number of nuchal loops present:  1   Cord blood obtained: Yes   Cord gases obtained:  No   Cord gas results: Venous:  No results found for: \"PHCVEN\", \"BECVEN\"    Arterial:  No results found for: \"PHCART\", \"BECART\"     Repair     Episiotomy: None    No    Lacerations: Yes  Laceration Information  Laceration Repaired?   Perineal: None     Periurethral: left Yes   Labial:       Sulcus:       Vaginal: Yes Yes   Cervical:         Suture used for repair: 2-0 Vicryl  Laceration Length for 3rd or 4th degree lacerations: n/a    Estimated Blood Loss:       Quantitative Blood Loss: Quantitative Blood Loss (mL): 63 mL (10/21/24 2240)        Complications     none    Disposition     Mother to Mother Baby/Postpartum  in stable condition currently.  Baby to NICU  in stable condition currently.    Monique Herman MD  10/22/24  00:01 EDT        "

## 2024-10-22 NOTE — DISCHARGE SUMMARY
VAGINAL DELIVERY DISCHARGE SUMMARY      PATIENT: Latha Dawson        MR#:[id number]  LOCATION: [facility]  ADMISSION  DIAGNOSIS:    (spontaneous vaginal delivery)     premature rupture of membranes (PPROM) with unknown onset of labor     DISCHARGE DIAGNOSIS: No diagnosis found.  SERVICE: Obstetrics    DATE OF ADMISSION: 10/21/2024  DATE OF DISCHARGE: 10/23/24        (spontaneous vaginal delivery)     premature rupture of membranes (PPROM) with unknown onset of labor        PROCEDURES:  Vaginal, Spontaneous    10/21/2024   10:29 PM     RH STATUS: O negative   SYPHILIS SCREEN DELIVERY ADMIT: treponemal antibody non-reactive upon admission  RUBELLA: immune  VARICELLA: unknown immunity  INFANT GENDER: female      HOSPITAL COURSE: Latha underwent vaginal delivery of a female infant and remained in the hospital for 2 days. During that time, Latha remained afebrile and hemodynamically stable. On the day of discharge, Latha was eating, ambulating, passing flatus, and voiding without difficulty. Her hemoglobin was 11.4 postpartum.      LABS:   Lab Results   Component Value Date    HGB 11.4 (L) 10/22/2024    HCT 34.3 10/22/2024    MCV 88.6 10/22/2024     10/22/2024    CREATININE 0.40 (L) 10/21/2024    AST 17 10/21/2024    ALT 18 10/21/2024     Results from last 7 days   Lab Units 10/22/24  0011 10/21/24  0251   ABO TYPING  O O   RH TYPING  Negative Negative   ANTIBODY SCREEN   --  Positive       DISCHARGE MEDICATIONS     Discharge Medications        New Medications        Instructions Start Date   cyclobenzaprine 5 MG tablet  Commonly known as: FLEXERIL   5 mg, Oral, 3 Times Daily PRN      docusate sodium 100 MG capsule   100 mg, Oral, 2 Times Daily      ibuprofen 600 MG tablet  Commonly known as: ADVIL,MOTRIN   600 mg, Oral, Every 6 Hours PRN             Continue These Medications        Instructions Start Date   famotidine 20 MG tablet  Commonly known as: Pepcid   20 mg, Oral,  Daily      folic acid 400 MCG tablet  Commonly known as: FOLVITE   400 mcg, Oral, Daily      magnesium oxide 400 MG tablet  Commonly known as: MAG-OX   400 mg, Oral, Daily      Prenatal Plus Iron 29-1 MG tablet   1 tablet, Oral, Daily             Stop These Medications      promethazine 12.5 MG tablet  Commonly known as: PHENERGAN     terconazole 0.4 % vaginal cream  Commonly known as: TERAZOL 7     vitamin B-6 25 MG tablet  Commonly known as: PYRIDOXINE              DISCHARGE DISPOSITION: Home    DISCHARGE CONDITION: Stable    DISCHARGE DIET: Regular    ACTIVITY AT DISCHARGE: Pelvic rest    INFANT FEEDING PLANS: Breast    EDUCATION: Warning signs and symptoms given, no tub baths, nothing in the vagina for 6 weeks.     FOLLOW-UP APPOINTMENTS: Follow up with [facility]  in 4 to 6 weeks for routine postpartum visit.     Destinee Sierra CNM  10/23/24  11:27 EDT

## 2024-10-22 NOTE — OUTREACH NOTE
[facility]  IP Postpartum    Questions/Answers      Flowsheet Row Responses   Best Method for Contacting Cell   Support Person Present No   Does the patient have a car seat at the hospital Yes   Delivery Note Reviewed Reviewed   Were birth expectations met? Yes   Is there a need for additional support/resources? No   Lactation Note Reviewed Reviewed   Is additional support needed? No   Any questions or concerns? No   Confirm Postpartum OB appointment --  [reviewed]   Confirm initial well-child Pediatrician appointment date/time: --  [reviewed]   Does patient have transportation to appointments? Yes   Any other assistance needed to ensure she is able to attend appointments? No   Does patient have supplies needed at home for  care? Breast Pump, Clothing, Crib, Diapers, Formula           [id number]. She is doing well. She has her breast pump and other necessary infant items. [facility] rep will see her tomorrow morning. Reviewed f/u appts. F/u call in 1-2 weeks.     Elaine Obrien RN  Maternity Nurse Navigator    10/22/2024, 14:35 EDT

## 2024-10-22 NOTE — LACTATION NOTE
This note was copied from a baby's chart.  P1, 35/5 baby is transitioning in the NICU. Feeding plan is bf and formula. HGP set up with instructions on modes, cleaning, feeding method. Encouraged pumping every 3 hours even if baby goes to breast and to attempt bf before giving formula; also to have family bring nursing bra if possible.Explained nipple care and that small amounts of colostrum are normal before milk comes in fully around day 3-5. Observed pumping and changed to 21 flange. Nothing expressed. Will fax rx for PBP.  number on board.   ID: [id number]

## 2024-10-22 NOTE — PLAN OF CARE
Goal Outcome Evaluation:  Plan of Care Reviewed With: patient        Progress: improving     VSS, fundus and lochia WDL, up ad radha, voiding without difficulty, pain control with PO meds, bottle feeding term formula.   Problem: Adult Inpatient Plan of Care  Goal: Plan of Care Review  Outcome: Progressing  Flowsheets (Taken 10/22/2024 1929)  Progress: improving  Plan of Care Reviewed With: patient  Goal: Patient-Specific Goal (Individualized)  Outcome: Progressing  Goal: Absence of Hospital-Acquired Illness or Injury  Outcome: Progressing  Intervention: Identify and Manage Fall Risk  Description: Perform standard risk assessment on admission using a validated tool or comprehensive approach appropriate to the patient; reassess fall risk frequently, with change in status or transfer to another level of care.  Communicate risk to interprofessional healthcare team; ensure fall risk visible cue.  Determine need for increased observation, equipment and environmental modification, as well as use of supportive, nonskid footwear.  Adjust safety measures to individual needs and identified risk factors.  Reinforce the importance of active participation with fall risk prevention, safety, and physical activity with the patient and family.  Perform regular intentional rounding to assess need for position change, pain assessment and personal needs, including assistance with toileting.  Recent Flowsheet Documentation  Taken 10/22/2024 1850 by Anais Mondragon RN  Safety Promotion/Fall Prevention: safety round/check completed  Taken 10/22/2024 1608 by Anais Mondragon RN  Safety Promotion/Fall Prevention: safety round/check completed  Taken 10/22/2024 1427 by Anais Mondragon RN  Safety Promotion/Fall Prevention: safety round/check completed  Taken 10/22/2024 1300 by Anais Mondragon RN  Safety Promotion/Fall Prevention: safety round/check completed  Taken 10/22/2024 1239 by Anais Mondragon RN  Safety Promotion/Fall Prevention:  safety round/check completed  Taken 10/22/2024 1040 by Anais Mondragon RN  Safety Promotion/Fall Prevention: safety round/check completed  Taken 10/22/2024 0900 by Anais Mondragon RN  Safety Promotion/Fall Prevention: safety round/check completed  Taken 10/22/2024 0800 by Anais Mondragon RN  Safety Promotion/Fall Prevention: safety round/check completed  Intervention: Prevent Skin Injury  Description: Perform a screening for skin injury risk, such as pressure or moisture-associated skin damage on admission and at regular intervals throughout hospital stay.  Keep all areas of skin (especially folds) clean and dry.  Maintain adequate skin hydration.  Relieve and redistribute pressure and protect bony prominences and skin at risk for injury; implement measures based on patient-specific risk factors.  Match turning and repositioning schedule to clinical condition.  Encourage weight shift frequently; assist with reposition if unable to complete independently.  Float heels off bed; avoid pressure on the Achilles tendon.  Keep skin free from extended contact with medical devices.  Optimize nutrition and hydration.  Encourage functional activity and mobility, as early as tolerated.  Use aids (e.g., slide boards, mechanical lift) during transfer.  Recent Flowsheet Documentation  Taken 10/22/2024 0800 by Anais Mondragon RN  Body Position: position changed independently  Intervention: Prevent Infection  Description: Maintain skin and mucous membrane integrity; promote hand, oral and pulmonary hygiene.  Optimize fluid balance, nutrition, sleep and glycemic control to maximize infection resistance.  Identify potential sources of infection early to prevent or mitigate progression of infection (e.g., wound, lines, devices).  Evaluate ongoing need for invasive devices; remove promptly when no longer indicated.  Review vaccination status.  Recent Flowsheet Documentation  Taken 10/22/2024 0800 by Anais Mondragon RN  Infection  Prevention:   hand hygiene promoted   rest/sleep promoted  Goal: Optimal Comfort and Wellbeing  Outcome: Progressing  Intervention: Monitor Pain and Promote Comfort  Description: Assess pain level, treatment efficacy and patient response at regular intervals using a consistent pain scale.  Consider the presence and impact of preexisting chronic pain.  Encourage patient and caregiver involvement in pain assessment, interventions and safety measures.  Promote activity; balance with sleep and rest to enhance healing.  Recent Flowsheet Documentation  Taken 10/22/2024 1608 by Anais Mondragon RN  Pain Management Interventions: pain medication given  Taken 10/22/2024 0800 by Anais Mondragon RN  Pain Management Interventions: pain medication given  Intervention: Provide Person-Centered Care  Description: Use a family-focused approach to care; encourage support system presence and participation.  Develop trust and rapport by proactively providing information, encouraging questions, addressing concerns and offering reassurance.  Acknowledge emotional response to hospitalization.  Recognize and utilize personal coping strategies and strengths; develop goals via shared decision-making.  Honor spiritual and cultural preferences.  Recent Flowsheet Documentation  Taken 10/22/2024 0800 by Anais Mondragon RN  Trust Relationship/Rapport:   care explained   choices provided   questions answered   questions encouraged  Goal: Readiness for Transition of Care  Outcome: Progressing     Problem: Skin Injury Risk Increased  Goal: Skin Health and Integrity  Outcome: Progressing  Intervention: Optimize Skin Protection  Description: Perform a full pressure injury risk assessment, as indicated by screening, upon admission to care unit.  Reassess skin (full inspection and injury risk, including skin temperature, consistency and color) frequently (e.g., scheduled interval, with change in condition) to provide optimal early detection and  prevention.  Maintain adequate tissue perfusion (e.g., encourage fluid balance; avoid crossing legs, constrictive clothing or devices) to promote tissue oxygenation.  Maintain head of bed at lowest degree of elevation tolerated, considering medical condition and other restrictions. Use positioning supports to prevent sliding and friction. Consider low friction textiles.  Avoid positioning onto an area that remains reddened or on bony prominences.  Minimize incontinence and moisture (e.g., toileting schedule; moisture-wicking pad, diaper or incontinence collection device; skin moisture barrier).  Cleanse skin promptly and gently, when soiled, utilizing a pH-balanced cleanser.  Relieve and redistribute pressure (e.g., scheduled position changes, weight shifts, use of support surface, medical device repositioning, protective dressing application, use of positioning device, microclimate control, use of pressure-injury-monitor  Encourage increased activity, such as sitting in a chair at the bedside or early mobilization, when able to tolerate. Avoid prolonged sitting.  Recent Flowsheet Documentation  Taken 10/22/2024 0800 by Anais Mondragon RN  Activity Management: up ad radha  Head of Bed (HOB) Positioning: HOB elevated     Problem: Anesthesia/Analgesia, Neuraxial  Goal: Effective Urinary Elimination  Outcome: Progressing     Problem:  Fall Injury Risk  Goal: Absence of Fall, Infant Drop and Related Injury  Outcome: Progressing  Intervention: Identify and Manage Contributors  Description: Develop a fall prevention plan, considering patient-centered interventions and family/caregiver involvement; identify and address patient's facilitators and barriers.  Promote use of personal vision and auditory aids.  Assess infant location, status and maternal assistance level required for safe and effective self and infant care; provide support for toileting, mobility and placement of infant in crib/bassinet, as  needed.  Define behavior and activity limits to patient and family to decrease fall or drop risk.  If fall occurs, assess the severity of injury; implement fall injury protocol. Determine the cause and revise fall injury prevention plan.  If fall occurs during pregnancy, assess fetal heart rate and movement, presence of uterine contractions or vaginal bleeding; verify membrane status.  Regularly review medication and contribution to fall risk; consider polypharmacy and high-risk medications that may include neuraxial anesthesia, sedation and narcotic analgesia given within the last 24 hours.  Balance adequate pain management with potential for oversedation.  Recent Flowsheet Documentation  Taken 10/22/2024 0800 by Anais Mondragon RN  Medication Review/Management: medications reviewed  Intervention: Promote Injury-Free Environment  Description: Provide a safe, barrier-free environment that encourages independent activity.  Keep care area uncluttered and well-lighted.  Determine the need for increased observation or monitoring.  Avoid use of devices that minimize mobility, such as restraints or indwelling urinary catheter.  Recent Flowsheet Documentation  Taken 10/22/2024 1850 by Anais Mondragon RN  Safety Promotion/Fall Prevention: safety round/check completed  Taken 10/22/2024 1608 by Anais Mondragon RN  Safety Promotion/Fall Prevention: safety round/check completed  Taken 10/22/2024 1427 by Anais Mondragon RN  Safety Promotion/Fall Prevention: safety round/check completed  Taken 10/22/2024 1300 by Anais Mondragon RN  Safety Promotion/Fall Prevention: safety round/check completed  Taken 10/22/2024 1239 by Anais Mondragon RN  Safety Promotion/Fall Prevention: safety round/check completed  Taken 10/22/2024 1040 by Anais Mondragon RN  Safety Promotion/Fall Prevention: safety round/check completed  Taken 10/22/2024 0900 by Anais Mondragon RN  Safety Promotion/Fall Prevention: safety round/check completed  Taken  10/22/2024 0800 by Anais Mondragon RN  Safety Promotion/Fall Prevention: safety round/check completed     Problem: Postpartum (Vaginal Delivery)  Goal: Successful Parent Role Transition  Outcome: Progressing  Intervention: Support Parent Role Transition  Description: Develop trust, relationship and rapport.  Use a family-focused approach; promote involvement of support system in infant care.  Incorporate cultural beliefs, rituals and practices in family-centered care.  Encourage and support breastfeeding, frequent holding and skin-to-skin contact with .  Encourage attachment behaviors; promote involvement in infant care.  Monitor patient emotional state, as well as patient-infant interaction.  Encourage and answer questions; share resources for support following discharge.  Recent Flowsheet Documentation  Taken 10/22/2024 0800 by Anais Mondragon RN  Supportive Measures:   active listening utilized   decision-making supported   positive reinforcement provided   self-care encouraged  Goal: Hemostasis  Outcome: Progressing  Goal: Absence of Infection Signs and Symptoms  Outcome: Progressing  Goal: Anesthesia/Sedation Recovery  Outcome: Progressing  Intervention: Optimize Anesthesia Recovery  Description: Assess and monitor airway, breathing and circulation; maintain close surveillance for deterioration.  Elevate head of bed, if able; facilitate regular position changes.  Assess and monitor neurovascular and neuromuscular function, such as motor strength, tone, posture, peripheral pulses and extremity sensation; protect areas of decreased sensation from heat, cold, medical devices or objects.  Individualize frequency and intensity of monitoring based on sedation or anesthesia administered, identified risk factors, ongoing assessment and organizational protocol.  Prepare for administration of pharmacologic therapy, such as reversal agent, antiemetic or antipruritic medication, to manage sedation or anesthesia  effects.  Adjust environment to maintain safety (e.g., fall precautions, safety equipment).  Recent Flowsheet Documentation  Taken 10/22/2024 1850 by Anais Mondragon RN  Safety Promotion/Fall Prevention: safety round/check completed  Taken 10/22/2024 1608 by Anais Mondragon RN  Safety Promotion/Fall Prevention: safety round/check completed  Taken 10/22/2024 1427 by Anais Mondragon RN  Safety Promotion/Fall Prevention: safety round/check completed  Taken 10/22/2024 1300 by Anais Mondragon RN  Safety Promotion/Fall Prevention: safety round/check completed  Taken 10/22/2024 1239 by Anais Mondragon RN  Safety Promotion/Fall Prevention: safety round/check completed  Taken 10/22/2024 1040 by Anais Mondragon RN  Safety Promotion/Fall Prevention: safety round/check completed  Taken 10/22/2024 0900 by Anais Mondragon RN  Safety Promotion/Fall Prevention: safety round/check completed  Taken 10/22/2024 0800 by Anais Mondragon RN  Safety Promotion/Fall Prevention: safety round/check completed  Goal: Optimal Pain Control and Function  Outcome: Progressing  Intervention: Prevent or Manage Pain  Description: Set pain management goals; mutually determine pain management plan and review plan regularly.  Use a consistent, validated tool for pain assessment, including function and quality of life; evaluate pain level, effect of treatment and patient's response at regular intervals.  Match pharmacologic analgesia to severity and type of pain mechanism; evaluate risk for opioid use and dependence; consider multimodal approach and titrate to patient response.  Manage medication-induced effects, such as constipation, pruritus, nausea, urinary retention, somnolence and dizziness.  Initiate individualized nonpharmacologic pain management measures; consider addition of complementary or alternative therapy.  Consider and address emotional response to pain.  Monitor perineal condition; note presence of hematoma, hemorrhoids and  episiotomy appearance (if applicable).  Use cold application, as culturally-appropriate, to the perineal area for the first 24 to 48 hours following delivery for comfort.  Verify correct infant latch when breastfeeding to prevent nipple pain.  If engorgement occurs, encourage more frequent breastfeeding or pumping and storing additional milk to ease discomfort. Cold compresses, as culturally-appropriate, may be used if bottle-feeding.  If postdural puncture headache identified, encourage adequate hydration and anticipate the need for epidural blood patch.  If hemorrhoids are present and painful, offer topical pain relief and sitz baths for comfort.  Recent Flowsheet Documentation  Taken 10/22/2024 1608 by Anais Mondragon RN  Pain Management Interventions: pain medication given  Taken 10/22/2024 0800 by Anais Mondragon RN  Perineal Care:   absorbent brief/pad changed   perineum cleansed  Pain Management Interventions: pain medication given  Goal: Effective Urinary Elimination  Outcome: Progressing

## 2024-10-22 NOTE — PLAN OF CARE
Problem: Adult Inpatient Plan of Care  Goal: Plan of Care Review  Outcome: Progressing  Flowsheets (Taken 10/22/2024 0030)  Outcome Evaluation: Pt. delivered baby girl vaginally at 2229. Baby currently in NICU for transition. Vital signs and bleeding stable. Mom to be transferred to [facility] following recovery period. Pt. plans to breastfeed.  Plan of Care Reviewed With: patient  Goal: Patient-Specific Goal (Individualized)  Outcome: Progressing  Flowsheets (Taken 10/22/2024 0030)  Patient/Family-Specific Goals (Include Timeframe): Healthy mom and baby by discharge  Individualized Care Needs:  needed and education  Anxieties, Fears or Concerns: First baby.  infant  Goal: Absence of Hospital-Acquired Illness or Injury  Outcome: Progressing  Intervention: Identify and Manage Fall Risk  Recent Flowsheet Documentation  Taken 10/21/2024 2300 by Susan Murillo RN  Safety Promotion/Fall Prevention: safety round/check completed  Goal: Optimal Comfort and Wellbeing  Outcome: Progressing  Intervention: Provide Person-Centered Care  Recent Flowsheet Documentation  Taken 10/21/2024 2300 by Susan Murillo RN  Trust Relationship/Rapport:   care explained   choices provided   emotional support provided   empathic listening provided   questions answered   questions encouraged   reassurance provided   thoughts/feelings acknowledged  Goal: Readiness for Transition of Care  Outcome: Progressing     Problem: Skin Injury Risk Increased  Goal: Skin Health and Integrity  Outcome: Progressing     Problem: Anesthesia/Analgesia, Neuraxial  Goal: Effective Urinary Elimination  Outcome: Progressing     Problem:  Fall Injury Risk  Goal: Absence of Fall, Infant Drop and Related Injury  Outcome: Progressing  Intervention: Promote Injury-Free Environment  Recent Flowsheet Documentation  Taken 10/21/2024 2300 by Susan Murillo RN  Safety Promotion/Fall Prevention: safety round/check completed     Problem: Postpartum  (Vaginal Delivery)  Goal: Successful Parent Role Transition  Outcome: Progressing  Goal: Hemostasis  Outcome: Progressing  Goal: Absence of Infection Signs and Symptoms  Outcome: Progressing  Goal: Anesthesia/Sedation Recovery  Outcome: Progressing  Intervention: Optimize Anesthesia Recovery  Recent Flowsheet Documentation  Taken 10/21/2024 2300 by Susan Murillo, RN  Safety Promotion/Fall Prevention: safety round/check completed  Goal: Optimal Pain Control and Function  Outcome: Progressing  Goal: Effective Urinary Elimination  Outcome: Progressing   Goal Outcome Evaluation:  Plan of Care Reviewed With: patient           Outcome Evaluation: Pt. delivered baby girl vaginally at 2229. Baby currently in NICU for transition. Vital signs and bleeding stable. Mom to be transferred to [facility] following recovery period. Pt. plans to breastfeed.

## 2024-10-22 NOTE — PLAN OF CARE
Goal Outcome Evaluation:              Outcome Evaluation:  utilized.  VSS.  Assessment , wnl.

## 2024-10-22 NOTE — ANESTHESIA POSTPROCEDURE EVALUATION
Patient: Latha Dawson    Procedure Summary       Date: 10/21/24 Room / Location:     Anesthesia Start: 1239 Anesthesia Stop: 2229    Procedure: LABOR ANALGESIA Diagnosis:     Scheduled Providers:  Provider: Geovanny Fishman MD    Anesthesia Type: epidural ASA Status: 2            Anesthesia Type: epidural    Vitals  Vitals Value Taken Time   /63 10/21/24 2234   Temp 37.1 °C (98.8 °F) 10/21/24 2005   Pulse 124 10/21/24 2234   Resp 16 10/21/24 2005   SpO2 96 % 10/21/24 2209   Vitals shown include unfiled device data.        Post Anesthesia Care and Evaluation    Post Neuraxial Block status: No signs or symptoms of PDPH

## 2024-10-22 NOTE — LACTATION NOTE
This note was copied from a baby's chart.  Spectra pump delivered. Mom has been formula feeding baby and pumping with HGP, not getting any milk yet. Reviewed HGP operation. Encouraged pumping every 3hrs and call for any assistance or questions.

## 2024-10-22 NOTE — PROGRESS NOTES
Postpartum Progress Note      Status post Vaginal Delivery: Doing well postoperatively.     1) Postpartum care immediately following delivery :    Routine care, continue current care. Anticipate discharge home tomorrow.  used for this visit. D/c IV.     Hgb 11.6-->11.4  Rh status: O- s/p rhogam  Syphilis screen in hospital: nonreactive  Rubella: Immune  Gender: Female    Subjective     Postpartum Day 1: Vaginal delivery    The patient feels well. The patient denies emotional concerns. Pain is well controlled with current medications. The baby is well. The patient is ambulating well. The patient is tolerating a normal diet.     Objective     Vital signs in last 24 hours:  Temp:  [97.9 °F (36.6 °C)-98.9 °F (37.2 °C)] 97.9 °F (36.6 °C)  Heart Rate:  [] 90  Resp:  [16-18] 16  BP: ()/() 123/79      General:    alert, appears stated age, and cooperative   CV: RRR, no m/r/g   Lungs: CTAB   Abdomen:  Soft, Non-tender    Lochia:  appropriate   Uterine Fundus:   firm   Ext    Edema trace   DVT Evaluation:  No evidence of DVT seen on physical exam.     Lab Results   Component Value Date    WBC 18.29 (H) 10/22/2024    HGB 11.4 (L) 10/22/2024    HCT 34.3 10/22/2024    MCV 88.6 10/22/2024     10/22/2024       PRABHJOT Sanchez  10/22/2024  08:56 EDT

## 2024-10-23 VITALS
RESPIRATION RATE: 16 BRPM | SYSTOLIC BLOOD PRESSURE: 137 MMHG | TEMPERATURE: 98 F | HEART RATE: 80 BPM | OXYGEN SATURATION: 97 % | DIASTOLIC BLOOD PRESSURE: 81 MMHG | WEIGHT: 198 LBS

## 2024-10-23 PROCEDURE — 25010000002 MEDROXYPROGESTERONE 150 MG/ML SUSPENSION

## 2024-10-23 PROCEDURE — 0503F POSTPARTUM CARE VISIT: CPT

## 2024-10-23 RX ORDER — MEDROXYPROGESTERONE ACETATE 150 MG/ML
150 INJECTION, SUSPENSION INTRAMUSCULAR ONCE
Status: COMPLETED | OUTPATIENT
Start: 2024-10-23 | End: 2024-10-23

## 2024-10-23 RX ORDER — CYCLOBENZAPRINE HCL 10 MG
5 TABLET ORAL 3 TIMES DAILY PRN
Status: DISCONTINUED | OUTPATIENT
Start: 2024-10-23 | End: 2024-10-23 | Stop reason: HOSPADM

## 2024-10-23 RX ORDER — PSEUDOEPHEDRINE HCL 30 MG
100 TABLET ORAL 2 TIMES DAILY
Qty: 60 CAPSULE | Refills: 2 | Status: SHIPPED | OUTPATIENT
Start: 2024-10-23

## 2024-10-23 RX ORDER — IBUPROFEN 600 MG/1
600 TABLET, FILM COATED ORAL EVERY 6 HOURS PRN
Qty: 60 TABLET | Refills: 0 | Status: SHIPPED | OUTPATIENT
Start: 2024-10-23

## 2024-10-23 RX ORDER — CYCLOBENZAPRINE HCL 5 MG
5 TABLET ORAL 3 TIMES DAILY PRN
Qty: 10 TABLET | Refills: 0 | Status: SHIPPED | OUTPATIENT
Start: 2024-10-23

## 2024-10-23 RX ADMIN — DOCUSATE SODIUM 100 MG: 100 CAPSULE, LIQUID FILLED ORAL at 09:31

## 2024-10-23 RX ADMIN — Medication 1 TABLET: at 09:31

## 2024-10-23 RX ADMIN — IBUPROFEN 600 MG: 600 TABLET, FILM COATED ORAL at 09:31

## 2024-10-23 RX ADMIN — MEDROXYPROGESTERONE ACETATE 150 MG: 150 INJECTION, SUSPENSION INTRAMUSCULAR at 15:04

## 2024-10-23 NOTE — LACTATION NOTE
This note was copied from a baby's chart.   ID [id number].  Reports has not been latching baby because has no milk and baby is fussy at breast.  Has HGP at bedside and has pumped but not getting any milk.  Educated on pumping every 3 hours for 15 min for breast stimulation to establish adequate milk supply and that milk supply may be delayed due to being .  Encouraged to call LC if would like to latch baby.  Number is on whiteboard.

## 2024-10-23 NOTE — PLAN OF CARE
Goal Outcome Evaluation:  Plan of Care Reviewed With: patient        Progress: improving     VSS, fundus and lochia WDL, up ad radha, voiding without difficulty, pain control with PO meds, bottle feeding. Depovera to be given before discharge. DC today   Problem: Adult Inpatient Plan of Care  Goal: Plan of Care Review  Outcome: Met  Goal: Patient-Specific Goal (Individualized)  Outcome: Met  Goal: Absence of Hospital-Acquired Illness or Injury  Outcome: Met  Intervention: Identify and Manage Fall Risk  Description: Perform standard risk assessment on admission using a validated tool or comprehensive approach appropriate to the patient; reassess fall risk frequently, with change in status or transfer to another level of care.  Communicate risk to interprofessional healthcare team; ensure fall risk visible cue.  Determine need for increased observation, equipment and environmental modification, as well as use of supportive, nonskid footwear.  Adjust safety measures to individual needs and identified risk factors.  Reinforce the importance of active participation with fall risk prevention, safety, and physical activity with the patient and family.  Perform regular intentional rounding to assess need for position change, pain assessment and personal needs, including assistance with toileting.  Recent Flowsheet Documentation  Taken 10/23/2024 1030 by Anais Mondragon RN  Safety Promotion/Fall Prevention: safety round/check completed  Taken 10/23/2024 0931 by Anais Mondragon RN  Safety Promotion/Fall Prevention: safety round/check completed  Taken 10/23/2024 0930 by Anais Mondragon RN  Safety Promotion/Fall Prevention: safety round/check completed  Taken 10/23/2024 0850 by Anais Mondragon RN  Safety Promotion/Fall Prevention: safety round/check completed  Intervention: Prevent Skin Injury  Description: Perform a screening for skin injury risk, such as pressure or moisture-associated skin damage on admission and at  regular intervals throughout hospital stay.  Keep all areas of skin (especially folds) clean and dry.  Maintain adequate skin hydration.  Relieve and redistribute pressure and protect bony prominences and skin at risk for injury; implement measures based on patient-specific risk factors.  Match turning and repositioning schedule to clinical condition.  Encourage weight shift frequently; assist with reposition if unable to complete independently.  Float heels off bed; avoid pressure on the Achilles tendon.  Keep skin free from extended contact with medical devices.  Optimize nutrition and hydration.  Encourage functional activity and mobility, as early as tolerated.  Use aids (e.g., slide boards, mechanical lift) during transfer.  Recent Flowsheet Documentation  Taken 10/23/2024 0930 by Anais Mondragon RN  Body Position: position changed independently  Intervention: Prevent Infection  Description: Maintain skin and mucous membrane integrity; promote hand, oral and pulmonary hygiene.  Optimize fluid balance, nutrition, sleep and glycemic control to maximize infection resistance.  Identify potential sources of infection early to prevent or mitigate progression of infection (e.g., wound, lines, devices).  Evaluate ongoing need for invasive devices; remove promptly when no longer indicated.  Review vaccination status.  Recent Flowsheet Documentation  Taken 10/23/2024 0930 by Anais Mondragon, RN  Infection Prevention:   hand hygiene promoted   rest/sleep promoted  Goal: Optimal Comfort and Wellbeing  Outcome: Met  Intervention: Monitor Pain and Promote Comfort  Description: Assess pain level, treatment efficacy and patient response at regular intervals using a consistent pain scale.  Consider the presence and impact of preexisting chronic pain.  Encourage patient and caregiver involvement in pain assessment, interventions and safety measures.  Promote activity; balance with sleep and rest to enhance healing.  Recent  Flowsheet Documentation  Taken 10/23/2024 0931 by Anais Mondragon RN  Pain Management Interventions: pain medication given  Intervention: Provide Person-Centered Care  Description: Use a family-focused approach to care; encourage support system presence and participation.  Develop trust and rapport by proactively providing information, encouraging questions, addressing concerns and offering reassurance.  Acknowledge emotional response to hospitalization.  Recognize and utilize personal coping strategies and strengths; develop goals via shared decision-making.  Honor spiritual and cultural preferences.  Recent Flowsheet Documentation  Taken 10/23/2024 0930 by Anais Mondragon RN  Trust Relationship/Rapport:   care explained   choices provided   questions answered   questions encouraged  Goal: Readiness for Transition of Care  Outcome: Met     Problem: Skin Injury Risk Increased  Goal: Skin Health and Integrity  Outcome: Met  Intervention: Optimize Skin Protection  Description: Perform a full pressure injury risk assessment, as indicated by screening, upon admission to care unit.  Reassess skin (full inspection and injury risk, including skin temperature, consistency and color) frequently (e.g., scheduled interval, with change in condition) to provide optimal early detection and prevention.  Maintain adequate tissue perfusion (e.g., encourage fluid balance; avoid crossing legs, constrictive clothing or devices) to promote tissue oxygenation.  Maintain head of bed at lowest degree of elevation tolerated, considering medical condition and other restrictions. Use positioning supports to prevent sliding and friction. Consider low friction textiles.  Avoid positioning onto an area that remains reddened or on bony prominences.  Minimize incontinence and moisture (e.g., toileting schedule; moisture-wicking pad, diaper or incontinence collection device; skin moisture barrier).  Cleanse skin promptly and gently, when soiled,  utilizing a pH-balanced cleanser.  Relieve and redistribute pressure (e.g., scheduled position changes, weight shifts, use of support surface, medical device repositioning, protective dressing application, use of positioning device, microclimate control, use of pressure-injury-monitor  Encourage increased activity, such as sitting in a chair at the bedside or early mobilization, when able to tolerate. Avoid prolonged sitting.  Recent Flowsheet Documentation  Taken 10/23/2024 0930 by Anais Mondragon RN  Activity Management: up ad radha  Head of Bed (HOB) Positioning: HOB elevated     Problem:  Fall Injury Risk  Goal: Absence of Fall, Infant Drop and Related Injury  Outcome: Met  Intervention: Identify and Manage Contributors  Description: Develop a fall prevention plan, considering patient-centered interventions and family/caregiver involvement; identify and address patient's facilitators and barriers.  Promote use of personal vision and auditory aids.  Assess infant location, status and maternal assistance level required for safe and effective self and infant care; provide support for toileting, mobility and placement of infant in crib/bassinet, as needed.  Define behavior and activity limits to patient and family to decrease fall or drop risk.  If fall occurs, assess the severity of injury; implement fall injury protocol. Determine the cause and revise fall injury prevention plan.  If fall occurs during pregnancy, assess fetal heart rate and movement, presence of uterine contractions or vaginal bleeding; verify membrane status.  Regularly review medication and contribution to fall risk; consider polypharmacy and high-risk medications that may include neuraxial anesthesia, sedation and narcotic analgesia given within the last 24 hours.  Balance adequate pain management with potential for oversedation.  Recent Flowsheet Documentation  Taken 10/23/2024 0930 by Anais Mondragon, RN  Medication Review/Management:  medications reviewed  Intervention: Promote Injury-Free Environment  Description: Provide a safe, barrier-free environment that encourages independent activity.  Keep care area uncluttered and well-lighted.  Determine the need for increased observation or monitoring.  Avoid use of devices that minimize mobility, such as restraints or indwelling urinary catheter.  Recent Flowsheet Documentation  Taken 10/23/2024 1030 by Anais Mondragon RN  Safety Promotion/Fall Prevention: safety round/check completed  Taken 10/23/2024 0931 by Anais Mondragon RN  Safety Promotion/Fall Prevention: safety round/check completed  Taken 10/23/2024 0930 by Anais Mondragon RN  Safety Promotion/Fall Prevention: safety round/check completed  Taken 10/23/2024 0850 by Anais Mondragon RN  Safety Promotion/Fall Prevention: safety round/check completed     Problem: Postpartum (Vaginal Delivery)  Goal: Successful Parent Role Transition  Outcome: Met  Intervention: Support Parent Role Transition  Description: Develop trust, relationship and rapport.  Use a family-focused approach; promote involvement of support system in infant care.  Incorporate cultural beliefs, rituals and practices in family-centered care.  Encourage and support breastfeeding, frequent holding and skin-to-skin contact with .  Encourage attachment behaviors; promote involvement in infant care.  Monitor patient emotional state, as well as patient-infant interaction.  Encourage and answer questions; share resources for support following discharge.  Recent Flowsheet Documentation  Taken 10/23/2024 0930 by Anais Mondragon RN  Supportive Measures:   active listening utilized   decision-making supported   positive reinforcement provided   self-care encouraged  Goal: Hemostasis  Outcome: Met  Goal: Absence of Infection Signs and Symptoms  Outcome: Met  Goal: Anesthesia/Sedation Recovery  Outcome: Met  Intervention: Optimize Anesthesia Recovery  Description: Assess and  monitor airway, breathing and circulation; maintain close surveillance for deterioration.  Elevate head of bed, if able; facilitate regular position changes.  Assess and monitor neurovascular and neuromuscular function, such as motor strength, tone, posture, peripheral pulses and extremity sensation; protect areas of decreased sensation from heat, cold, medical devices or objects.  Individualize frequency and intensity of monitoring based on sedation or anesthesia administered, identified risk factors, ongoing assessment and organizational protocol.  Prepare for administration of pharmacologic therapy, such as reversal agent, antiemetic or antipruritic medication, to manage sedation or anesthesia effects.  Adjust environment to maintain safety (e.g., fall precautions, safety equipment).  Recent Flowsheet Documentation  Taken 10/23/2024 1030 by Anais Mondragon RN  Safety Promotion/Fall Prevention: safety round/check completed  Taken 10/23/2024 0931 by Anais Mondragon RN  Safety Promotion/Fall Prevention: safety round/check completed  Taken 10/23/2024 0930 by Anais Mondragon RN  Safety Promotion/Fall Prevention: safety round/check completed  Taken 10/23/2024 0850 by Anais Mondragon RN  Safety Promotion/Fall Prevention: safety round/check completed  Goal: Optimal Pain Control and Function  Outcome: Met  Intervention: Prevent or Manage Pain  Description: Set pain management goals; mutually determine pain management plan and review plan regularly.  Use a consistent, validated tool for pain assessment, including function and quality of life; evaluate pain level, effect of treatment and patient's response at regular intervals.  Match pharmacologic analgesia to severity and type of pain mechanism; evaluate risk for opioid use and dependence; consider multimodal approach and titrate to patient response.  Manage medication-induced effects, such as constipation, pruritus, nausea, urinary retention, somnolence and  dizziness.  Initiate individualized nonpharmacologic pain management measures; consider addition of complementary or alternative therapy.  Consider and address emotional response to pain.  Monitor perineal condition; note presence of hematoma, hemorrhoids and episiotomy appearance (if applicable).  Use cold application, as culturally-appropriate, to the perineal area for the first 24 to 48 hours following delivery for comfort.  Verify correct infant latch when breastfeeding to prevent nipple pain.  If engorgement occurs, encourage more frequent breastfeeding or pumping and storing additional milk to ease discomfort. Cold compresses, as culturally-appropriate, may be used if bottle-feeding.  If postdural puncture headache identified, encourage adequate hydration and anticipate the need for epidural blood patch.  If hemorrhoids are present and painful, offer topical pain relief and sitz baths for comfort.  Recent Flowsheet Documentation  Taken 10/23/2024 0931 by Anais Mondragon RN  Pain Management Interventions: pain medication given  Taken 10/23/2024 0930 by Anais Mondragon RN  Perineal Care:   absorbent brief/pad changed   perineum cleansed  Goal: Effective Urinary Elimination  Outcome: Met

## 2024-10-23 NOTE — PLAN OF CARE
Problem: Adult Inpatient Plan of Care  Goal: Plan of Care Review  Outcome: Progressing  Flowsheets  Taken 10/22/2024 2154 by Kyara Nolasco RN  Progress: improving  Outcome Evaluation:  utilized. VSS, assessment WNL, pain well controled with motrin. bottle feeding, plan for dc tomorrow  Taken 10/22/2024 1929 by Anais Mondragon RN  Plan of Care Reviewed With: patient  Goal: Patient-Specific Goal (Individualized)  Outcome: Progressing  Goal: Absence of Hospital-Acquired Illness or Injury  Outcome: Progressing  Intervention: Identify and Manage Fall Risk  Recent Flowsheet Documentation  Taken 10/22/2024 2101 by Kyara Nloasco RN  Safety Promotion/Fall Prevention: safety round/check completed  Intervention: Prevent Skin Injury  Recent Flowsheet Documentation  Taken 10/22/2024 2101 by Kyara Nolasco RN  Body Position: position changed independently  Intervention: Prevent Infection  Recent Flowsheet Documentation  Taken 10/22/2024 2101 by Kyara Nolasco RN  Infection Prevention: hand hygiene promoted  Goal: Optimal Comfort and Wellbeing  Outcome: Progressing  Intervention: Provide Person-Centered Care  Recent Flowsheet Documentation  Taken 10/22/2024 2101 by Kyara Nolasco RN  Trust Relationship/Rapport:   care explained   choices provided   questions answered   questions encouraged  Goal: Readiness for Transition of Care  Outcome: Progressing     Problem: Skin Injury Risk Increased  Goal: Skin Health and Integrity  Outcome: Progressing  Intervention: Optimize Skin Protection  Recent Flowsheet Documentation  Taken 10/22/2024 2101 by Kyara Nolasco RN  Activity Management: up ad radha  Head of Bed (HOB) Positioning: HOB elevated     Problem: Anesthesia/Analgesia, Neuraxial  Goal: Effective Urinary Elimination  Outcome: Progressing     Problem:  Fall Injury Risk  Goal: Absence of Fall, Infant Drop and Related Injury  Outcome: Progressing  Intervention: Promote Injury-Free  Environment  Recent Flowsheet Documentation  Taken 10/22/2024 2101 by Kyara Nolasco, RN  Safety Promotion/Fall Prevention: safety round/check completed     Problem: Postpartum (Vaginal Delivery)  Goal: Successful Parent Role Transition  Outcome: Progressing  Goal: Hemostasis  Outcome: Progressing  Goal: Absence of Infection Signs and Symptoms  Outcome: Progressing  Goal: Anesthesia/Sedation Recovery  Outcome: Progressing  Intervention: Optimize Anesthesia Recovery  Recent Flowsheet Documentation  Taken 10/22/2024 2101 by Kyara Nolasco RN  Safety Promotion/Fall Prevention: safety round/check completed  Goal: Optimal Pain Control and Function  Outcome: Progressing  Goal: Effective Urinary Elimination  Outcome: Progressing   Goal Outcome Evaluation:           Progress: improving  Outcome Evaluation:  utilized. VSS, assessment WNL, pain well controled with motrin. bottle feeding, plan for dc tomorrow

## 2024-10-23 NOTE — LACTATION NOTE
This note was copied from a baby's chart.  BGM stable. Mom is mostly feeding formula d/t no milk even with pumping.  Encouraged her to continue to stimulate breasts with HGP if not bf to ensure adequate milk supply and that milk will come in day 3-5. She verbalized understanding and is reviewing bf education in PP handbook. LC number on board for any needs.

## 2024-10-31 ENCOUNTER — PATIENT OUTREACH (OUTPATIENT)
Dept: LABOR AND DELIVERY | Facility: HOSPITAL | Age: 34
End: 2024-10-31
Payer: COMMERCIAL

## 2024-10-31 NOTE — OUTREACH NOTE
Motherhood Connection  Postpartum Check-In    Questions/Answers      Flowsheet Row Responses   Visit Setting Telephone   Best Method for Contacting Cell   OB Discharge Note Reviewed  Reviewed   OB Discharge Medications Reviewed  Reviewed    discharged home with mother? Yes   Current Pain Levels 0-10 0   At Rest Pain Levels 0-10 0   Pain level with activity 0-10 0   Acceptable Pain Level 0-10 0   Verbalized Emotional State Acceptance   Family/Support Network Sibling   Level of Involvement in Care Attentive, Interactive, Supportive   Do you feel comfortable in your relationship with your baby? Yes   Have members of your household adjusted to your baby? Yes   Is the baby's father supportive and/or involved with the baby? Yes   How does your partner feel about the baby? Happy, Involved   Do you feel safe at home, school and work? Yes   Are you in a relationship with someone who threatens you or hurts you? No   Do you have the resources to keep yourself and your baby healthy and safe? Yes   Lochia (per patient report) Brown-shawna Red   Amount Scant   Number of pads per day 2   Lochia Odor None   Is patient breastfeeding? Yes, pumping   pumping - Left Breast Nontender, Full   Pumping - Right Breast Nontender, Full   Pumping - Left Nipple Intact   Pumping - Right Nipple Intact   Frequency a few times per day   Pumping Quantity 2   Postpartum Depression Screening Education Education Provided   Doctor Appointments: Education Provided   Breastfeeding Education Education Provided   Postpartum Care Education Education Provided   S & S to report Education Provided   Followup Appointments Made No   Well Child Visit Appointments Made Yes   Well Child Checkup Provider Name [facility]   Well Child Check Up Date: 10/28/24   Did you complete the visit? Yes   Were there any specific concerns? No   Umbilical Cord No reported signs or symptoms   Feeding Readiness Cues: Eager, Crying   Infant Feeding Method Formula, Expressed  Breast Milk   Is a lactation referral indicated? No   Formula Type --  [similac 360]   Formula PO (mL) 2-2.5 oz   Formula/Expressed Milk frequency of feedings: every 3 hours   Expressed milk PO (mL) 2-2.5 oz   Expressed milk- frequency of feedings every 3 hours   Number of wet diapers x 24 hours 10   Last BM x 24 hours 2   What safe sleep surface is available? Bassinet   Are there stuffed animals, toys, pillows, quilts, blankets, wedges, positioners, bumpers or other loose bedding in the infant's sleeping environment? No   Where does the baby usually sleep? Bassinet   Does the baby ever share a sleep surface with a sibling, adult or pet? No   Does the baby ever share a sleep surface in a bed, couch, recliner or other? No   What position do you place your baby to sleep for naps? Back   What position do you place your baby to sleep at night Back   Are you and/or other caregivers smoking inside or outside the baby's home? No   Is the infant dressed appropiately for the temperature of the home? Yes   Do you use a clean, dry pacifier that is not attached to a string or stuffed animal? Yes            Review of Systems    Most Recent [place]  Depression Scale Score (EPDS)    Performed by a clinician: 5 (10/31/2024 11:19 AM)    5 Ps Screen  Done      [id number]. She is recovering well and reports no pain and scant bleeding. She is breastfeeding, pumping and supplementing. Her WIC benefits are active. Infant has been seen at the peds office. She requests help scheduling her PP visit and this was done while on the phone. She reports feeling a little emotional sometimes but she has good support from her sister. FOB has visited some. She has questions about ordering the birth certificate, info sent. Will send to RN call center.    Elaine HERNANDEZ - RN  Maternity Nurse Navigator    10/31/2024, 11:40 EDT

## 2024-11-07 ENCOUNTER — PATIENT OUTREACH (OUTPATIENT)
Dept: CALL CENTER | Facility: HOSPITAL | Age: 34
End: 2024-11-07
Payer: COMMERCIAL

## 2024-11-07 NOTE — OUTREACH NOTE
Motherhood Connection Survey      Flowsheet Row Responses   [facility]   [facility] successful? No   Unsuccessful attempts Attempt 1   Reschedule Today              Tita HERNANDEZ - Registered Nurse

## 2024-11-07 NOTE — OUTREACH NOTE
Motherhood Connection Survey      Flowsheet Row Responses   [facility] facility patient discharged from? [place]   Week 1 attempt successful? No   Unsuccessful attempts Attempt 2   Reschedule Tomorrow              Tita HERNANDEZ - Registered Nurse

## 2024-11-08 ENCOUNTER — PATIENT OUTREACH (OUTPATIENT)
Dept: CALL CENTER | Facility: HOSPITAL | Age: 34
End: 2024-11-08
Payer: COMMERCIAL

## 2024-11-08 NOTE — OUTREACH NOTE
Motherhood Connection Survey      Flowsheet Row Responses   [facility]   Week [facility] attempt successful? Yes  [[employer], [place], Minh, #[id number]]   Call start time 1515   Call end time 1533   Baby sex Girl   Rudolph discharged home with mother? Yes   Baby sex Girl   Delivery type Vaginal   Emotional state Acceptance   Family support Yes   Do you have all necessary resources to care for you and your baby?  Yes   Have members of your household adjusted to your baby? Yes   Did you have any problems with pre-eclampsia during this pregnancy? No   Did you have blood glucose issues during this pregnancy No   Lochia amount Light   Lochia per patient report Rubra   Feeding Method Combination   Frequency q 2 hrs   Pumping Yes   Supplementing Formula   Frequency q 3 hrs   Amount 3 oz   Nursing Interventions Lactation education provided   Number of wet diapers x 24 hours 8   Last BM x 24 hours 3   Umbilical Cord No reported signs or symptoms   Where does the baby usually sleep? Bassinet   Are there stuffed animals, toys, pillows, quilts, blankets, wedges, positioners, bumpers or other loose bedding in the infant's sleeping environment? No   Does the baby ever share a sleep surface in a bed, couch, recliner or other? No   What position do you lay your baby down to sleep? Back   Are you and/or other caregivers smoking inside or outside the baby's home? No   Mom appointment comments: pp f/u scheduled   Baby appointment comments: peds visits done, has been checking for heart murmur not detected while in hospital   Feeding method comment encouraged to reduce amounts of formula and increase time at the breast, pt states understanding   Call completed? Yes              Tita HERNANDEZ - Registered Nurse

## 2024-12-05 ENCOUNTER — POSTPARTUM VISIT (OUTPATIENT)
Dept: OBSTETRICS AND GYNECOLOGY | Facility: CLINIC | Age: 34
End: 2024-12-05
Payer: COMMERCIAL

## 2024-12-05 VITALS — SYSTOLIC BLOOD PRESSURE: 122 MMHG | DIASTOLIC BLOOD PRESSURE: 86 MMHG | WEIGHT: 173 LBS

## 2024-12-05 DIAGNOSIS — R10.31 RLQ ABDOMINAL PAIN: ICD-10-CM

## 2024-12-05 DIAGNOSIS — Z30.09 BIRTH CONTROL COUNSELING: ICD-10-CM

## 2024-12-05 NOTE — PROGRESS NOTES
MIRTA Dawson  is a 33 y.o. female who presents for a 6-week postpartum checkup.  Her baby is doing well.  The baby is both breast and bottlefeeding.  The patient is feeling well.  She does, however, complain of right lower quadrant pain.  She reports that the pain is mild but it does affect when she turns to the right or when she sleeps.  She did have surgery on the right ovary 2 years ago in [place].  She reports that a hemorrhagic cyst was removed.    Chief Complaint   Patient presents with    Postpartum Care     Vaginal birth 6 weeks pp        History reviewed. No pertinent past medical history.    Past Surgical History:   Procedure Laterality Date    OVARY BIOPSY         Social History     Socioeconomic History    Marital status: Single   Tobacco Use    Smoking status: Never    Smokeless tobacco: Never   Vaping Use    Vaping status: Never Used   Substance and Sexual Activity    Alcohol use: Never    Drug use: Never    Sexual activity: Yes       The following portions of the patient's history were reviewed and updated as appropriate: allergies, current medications, past family history, past medical history, past social history, past surgical history and problem list.    Review of Systems     This is positive for right lower quadrant pain.  It is negative for fever or chills.  Negative for nausea or vomiting.  Negative for change in bowel habits.  All other systems are reviewed and are negative.    Physical Exam  Vitals and nursing note reviewed.   Constitutional:       Appearance: She is well-developed.   HENT:      Head: Normocephalic and atraumatic.   Cardiovascular:      Rate and Rhythm: Normal rate and regular rhythm.   Pulmonary:      Effort: Pulmonary effort is normal.      Breath sounds: Normal breath sounds. No wheezing or rales.   Chest:      Comments: The breasts are homogeneous.  There are no palpable lumps.  Nipple discharge and axillary adenopathy are absent.  Abdominal:      General:  There is no distension.      Palpations: Abdomen is soft.          Comments: The abdomen is soft and nondistended.  There is mild tenderness on palpation of the lateral right lower quadrant.  Guarding and rebound are absent.   Genitourinary:     Labia:         Right: No lesion.         Left: No lesion.       Vagina: Normal. No vaginal discharge.      Cervix: No cervical motion tenderness.      Uterus: Normal. Not enlarged and not tender.       Adnexa:         Right: No mass or tenderness.          Left: No mass or tenderness.     Skin:     General: Skin is warm and dry.   Neurological:      Mental Status: She is alert and oriented to person, place, and time.         Assessment    Diagnoses and all orders for this visit:    1. Routine postpartum follow-up (Primary)    2. Birth control counseling    3. RLQ abdominal pain  -     US Non-ob Transvaginal; Future        Plan  Appropriate progress, 6 weeks postpartum.  Okay to resume all normal activities of daily living.  Contraceptive counseling.  We discussed the benefits and risks of Nexplanon versus Depo-Provera at the patient's request.  The patient asked appropriate questions and these were answered today.  She would like to proceed with Nexplanon.  She had a Depo-Provera shot at the hospital, which should  in January.  We will schedule Nexplanon before expiration of the patient's Depo.  Elevation of blood pressure.  The patient is completely asymptomatic.  We will repeat this today.  If it remains below the severe range, it will be rechecked at the next visit.  If it continues to be elevated, primary care referral will be indicated.  I counseled the patient and she agrees with these recommendations.  Right lower quadrant pain.  It is uncertain if this is referable to the patient's right ovarian surgery or if there is a new problem.  I recommend an ultrasound to assess this as well as a fibroid which was found at the beginning of the patient's pregnancy.  The  patient agrees with this recommendation.    No follow-ups on file.    Social History     Tobacco Use   Smoking Status Never   Smokeless Tobacco Never

## 2024-12-12 ENCOUNTER — PROCEDURE VISIT (OUTPATIENT)
Dept: OBSTETRICS AND GYNECOLOGY | Facility: CLINIC | Age: 34
End: 2024-12-12
Payer: COMMERCIAL

## 2024-12-12 VITALS — DIASTOLIC BLOOD PRESSURE: 100 MMHG | SYSTOLIC BLOOD PRESSURE: 142 MMHG

## 2024-12-12 DIAGNOSIS — I15.9 SECONDARY HYPERTENSION: ICD-10-CM

## 2024-12-12 DIAGNOSIS — D25.2 SUBSEROUS LEIOMYOMA OF UTERUS: Primary | ICD-10-CM

## 2024-12-12 RX ORDER — MEDROXYPROGESTERONE ACETATE 150 MG/ML
150 INJECTION, SUSPENSION INTRAMUSCULAR
Qty: 1 ML | Refills: 3 | Status: SHIPPED | OUTPATIENT
Start: 2024-12-12

## 2024-12-12 NOTE — PROGRESS NOTES
HPI   Latha Dawson  is a 33 y.o. female who presents to discuss several issues.  First, she had a fibroid measuring approximately 1.5 cm which was found in early pregnancy ultrasound.  Ultrasound was repeated recently to confirm stability of this finding.  Next, the patient would like to discuss contraceptive options.  Next, the patient's blood pressure has been increasing.  She does not have headaches or vision changes, but is concerned about her hypertension.    Chief Complaint   Patient presents with    Contraception     Nexpl. Ins   For 3-4 days bp has been getting higher and higher        History reviewed. No pertinent past medical history.    Past Surgical History:   Procedure Laterality Date    OVARY BIOPSY         Social History     Socioeconomic History    Marital status: Single   Tobacco Use    Smoking status: Never    Smokeless tobacco: Never   Vaping Use    Vaping status: Never Used   Substance and Sexual Activity    Alcohol use: Never    Drug use: Never    Sexual activity: Yes       The following portions of the patient's history were reviewed and updated as appropriate: allergies, current medications, past family history, past medical history, past social history, past surgical history and problem list.    Review of Systems     This is negative for fever or chills.  Negative for nausea or vomiting.  Negative for headaches or vision changes.  Negative for upper abdominal pain.    Physical Exam  Vitals and nursing note reviewed.   Constitutional:       Appearance: Normal appearance.   Neurological:      Mental Status: She is alert and oriented to person, place, and time.         Assessment    Diagnoses and all orders for this visit:    1. Subserous leiomyoma of uterus (Primary)  -     Ambulatory Referral to Infertility    2. Secondary hypertension  -     Ambulatory Referral to Family Practice    Other orders  -     medroxyPROGESTERone (Depo-Provera) 150 MG/ML injection; Inject 1 mL into the  appropriate muscle as directed by prescriber Every 3 (Three) Months.  Dispense: 1 mL; Refill: 3        Plan  Fibroids.  I counseled the patient and answered her questions.  We reviewed today's ultrasound and.  It with the early pregnancy ultrasound.  The patient's uterine lining is normal.  The fibroid appears to be subserous rather than submucosal.  It is stable in size.  There are no adnexal masses.  We discussed the fact that 30% of women have fibroids and that because the patient is asymptomatic, it is likely not to cause her any harm.  Nonetheless, the patient is very concerned about this.  She reports that her mother had a fibroid and treatment of it caused her not to be able to have children.  She would like to discuss this with a reproductive specialist.  A referral has been sent.  Contraceptive counseling.  The patient would like to have a baby sooner than 3 years from now.  She would like to use Depo-Provera.  We discussed the fact that it can take up to a year for Depo-Provera to exit the patient's system and that fertility may return more slowly using this then it would with Nexplanon.  I answered the patient's questions.  The patient would like to use Depo-Provera.  She understands that this could delay her opportunity to conceive.  Hypertension.  This has been persistent.  The patient takes her blood pressures at home and they remain elevated.  Usually, in the 140s over 90s.  I recommend a primary care referral and the patient agrees.  This has been sent.  30 minutes were spent in direct face-to-face counseling regarding the issues listed above.    No follow-ups on file.    Social History     Tobacco Use   Smoking Status Never   Smokeless Tobacco Never

## 2024-12-19 ENCOUNTER — OFFICE VISIT (OUTPATIENT)
Dept: FAMILY MEDICINE CLINIC | Facility: CLINIC | Age: 34
End: 2024-12-19
Payer: COMMERCIAL

## 2024-12-19 VITALS
OXYGEN SATURATION: 97 % | HEART RATE: 75 BPM | WEIGHT: 172.8 LBS | TEMPERATURE: 97.8 F | DIASTOLIC BLOOD PRESSURE: 92 MMHG | SYSTOLIC BLOOD PRESSURE: 140 MMHG

## 2024-12-19 DIAGNOSIS — I10 PRIMARY HYPERTENSION: Primary | ICD-10-CM

## 2024-12-19 DIAGNOSIS — R39.198 DIFFICULTY URINATING: ICD-10-CM

## 2024-12-19 DIAGNOSIS — Z76.89 ENCOUNTER TO ESTABLISH CARE: ICD-10-CM

## 2024-12-19 DIAGNOSIS — L85.8 KERATOSIS PILARIS: ICD-10-CM

## 2024-12-19 DIAGNOSIS — N30.01 ACUTE CYSTITIS WITH HEMATURIA: ICD-10-CM

## 2024-12-19 DIAGNOSIS — R30.0 DYSURIA: ICD-10-CM

## 2024-12-19 LAB
BILIRUB BLD-MCNC: ABNORMAL MG/DL
CLARITY, POC: ABNORMAL
COLOR UR: ABNORMAL
EXPIRATION DATE: ABNORMAL
GLUCOSE UR STRIP-MCNC: ABNORMAL MG/DL
KETONES UR QL: ABNORMAL
LEUKOCYTE EST, POC: ABNORMAL
Lab: ABNORMAL
NITRITE UR-MCNC: POSITIVE MG/ML
PH UR: 6 [PH] (ref 5–8)
PROT UR STRIP-MCNC: ABNORMAL MG/DL
RBC # UR STRIP: ABNORMAL /UL
SP GR UR: 1.01 (ref 1–1.03)
UROBILINOGEN UR QL: ABNORMAL

## 2024-12-19 RX ORDER — TRIAMCINOLONE ACETONIDE 5 MG/G
1 OINTMENT TOPICAL 2 TIMES DAILY
Qty: 15 G | Refills: 0 | Status: SHIPPED | OUTPATIENT
Start: 2024-12-19

## 2024-12-19 RX ORDER — NITROFURANTOIN 25; 75 MG/1; MG/1
100 CAPSULE ORAL 2 TIMES DAILY
Qty: 14 CAPSULE | Refills: 0 | Status: SHIPPED | OUTPATIENT
Start: 2024-12-19 | End: 2024-12-26

## 2024-12-19 RX ORDER — ENALAPRIL MALEATE 10 MG/1
10 TABLET ORAL DAILY
Qty: 90 TABLET | Refills: 0 | Status: SHIPPED | OUTPATIENT
Start: 2024-12-19

## 2024-12-19 NOTE — PROGRESS NOTES
Chief Complaint  Establish Care, Hypertension (Seen OB for postpartum appt on 12/5 blood pressure was 140/90 /140/100 - when checked at home / No symptoms when ), and Difficulty Urinating    Subjective        Latha Dawson presents to [facility]    History of Present Illness  34 year old female presents to establish care. Pt is new to me and this clinic. She was referred by Ob/GYN Dr. Medrano for hypertension and increasing BP readings in office. Blood pressure is elevated today at 140/92. She has BP cuff at home and states she has continuous readings of 140/90 and higher. Pt agreeable to once daily BP management medication. Confirms family history of hypertension with mother. Denies chest pain and shortness of breath. Pt reports she is formula feeding her baby.     She also reports difficulty urinating and dysuria that began 4 days. Reports she is taking Azo tablets she purchased OTC. POC urinalysis positive for glucose, bilirubin, blood, protein, nitrites, and leukocytes. Will send for culture. Encouraged patient to continue Azo tablets for symptom relief, wipe front to back, limit use of tight fitted pants, wear cotton underwear, and limit use of scented, perfume soaps. Information attached to AVS.     She has keratosis pilaris of arms and hands. Encouraged daily use of thick, non-scented moisturizer to areas to aid in hydration. Educated she may receive COVID vaccinations at her pharmacy. Return in 2 weeks with BP log for recheck.     Interpretor used during exam, provided by [facility].       Objective   Vital Signs:  /92 (BP Location: Left arm, Patient Position: Sitting, Cuff Size: Adult)   Pulse 75   Temp 97.8 °F (36.6 °C) (Infrared)   Wt 78.4 kg (172 lb 12.8 oz)   SpO2 97%   There is no height or weight on file to calculate BMI.      Physical Exam  Constitutional:       Appearance: Normal appearance.   HENT:      Head: Normocephalic.   Eyes:      Conjunctiva/sclera:  Conjunctivae normal.      Pupils: Pupils are equal, round, and reactive to light.   Cardiovascular:      Rate and Rhythm: Normal rate and regular rhythm.      Pulses: Normal pulses.      Heart sounds: Normal heart sounds.   Pulmonary:      Effort: Pulmonary effort is normal.      Breath sounds: Normal breath sounds.   Skin:     General: Skin is warm.   Neurological:      General: No focal deficit present.      Mental Status: She is alert and oriented to person, place, and time.   Psychiatric:         Mood and Affect: Mood normal.         Behavior: Behavior normal.            Result Review :  The following data was reviewed by: PRABHJOT Graf on 12/19/2024:  Common labs          4/2/2024    09:49 10/21/2024    02:51 10/22/2024    07:02   Common Labs   Glucose  87     BUN  6     Creatinine  0.40     Sodium  139     Potassium  3.7     Chloride  105     Calcium  9.7     Albumin  3.5     Total Bilirubin  <0.2     Alkaline Phosphatase  144     AST (SGOT)  17     ALT (SGPT)  18     WBC 9.4  14.37  18.29    Hemoglobin 13.2  11.6  11.4    Hematocrit 40.2  33.9  34.3    Platelets 182  157  164      Data reviewed : Ob/GYN visit 12/5/24           Assessment and Plan   Diagnoses and all orders for this visit:    1. Primary hypertension (Primary)  Assessment & Plan:  Patient has new onset Hypertension  Ambulatory BP monitoring  Medication changes per orders.  Dietary sodium restriction.  Counseled on importance of healthy eating and regular aerobic exercise  Advised to check BP regularly and call office if frequently >140/90  Blood pressure will be reassessed in 2 weeks.    Orders:  -     enalapril (VASOTEC) 10 MG tablet; Take 1 tablet by mouth Daily.  Dispense: 90 tablet; Refill: 0    2. Difficulty urinating  -     POCT urinalysis dipstick, automated  -     Urine Culture - Urine, Urine, Clean Catch; Future  -     nitrofurantoin, macrocrystal-monohydrate, (Macrobid) 100 MG capsule; Take 1 capsule by mouth 2 (Two) Times a  Day for 7 days.  Dispense: 14 capsule; Refill: 0    3. Dysuria  -     POCT urinalysis dipstick, automated  -     Urine Culture - Urine, Urine, Clean Catch; Future  -     nitrofurantoin, macrocrystal-monohydrate, (Macrobid) 100 MG capsule; Take 1 capsule by mouth 2 (Two) Times a Day for 7 days.  Dispense: 14 capsule; Refill: 0    4. Acute cystitis with hematuria  -     Urine Culture - Urine, Urine, Clean Catch; Future  -     nitrofurantoin, macrocrystal-monohydrate, (Macrobid) 100 MG capsule; Take 1 capsule by mouth 2 (Two) Times a Day for 7 days.  Dispense: 14 capsule; Refill: 0    5. Encounter to establish care    6. Keratosis pilaris  Comments:  Encouarged to use thick moisturizer to area to increase hydration.  Orders:  -     triamcinolone (KENALOG) 0.5 % ointment; Apply 1 Application topically to the appropriate area as directed 2 (Two) Times a Day.  Dispense: 15 g; Refill: 0             Follow Up   Return in about 2 weeks (around 1/2/2025) for Recheck BP.  Patient was given instructions and counseling regarding her condition or for health maintenance advice. Please see specific information pulled into the AVS if appropriate.

## 2024-12-19 NOTE — ASSESSMENT & PLAN NOTE
Patient has new onset Hypertension  Ambulatory BP monitoring  Medication changes per orders.  Dietary sodium restriction.  Counseled on importance of healthy eating and regular aerobic exercise  Advised to check BP regularly and call office if frequently >140/90  Blood pressure will be reassessed in 2 weeks.

## 2024-12-23 LAB
BACTERIA UR CULT: ABNORMAL
BACTERIA UR CULT: ABNORMAL
OTHER ANTIBIOTIC SUSC ISLT: ABNORMAL

## 2025-01-02 ENCOUNTER — OFFICE VISIT (OUTPATIENT)
Dept: FAMILY MEDICINE CLINIC | Facility: CLINIC | Age: 35
End: 2025-01-02
Payer: COMMERCIAL

## 2025-01-02 VITALS
TEMPERATURE: 97.7 F | SYSTOLIC BLOOD PRESSURE: 118 MMHG | DIASTOLIC BLOOD PRESSURE: 78 MMHG | HEART RATE: 89 BPM | WEIGHT: 173.8 LBS | OXYGEN SATURATION: 96 %

## 2025-01-02 DIAGNOSIS — Z01.30 BLOOD PRESSURE CHECK: ICD-10-CM

## 2025-01-02 DIAGNOSIS — R10.9 FLANK PAIN: ICD-10-CM

## 2025-01-02 DIAGNOSIS — N30.01 ACUTE CYSTITIS WITH HEMATURIA: ICD-10-CM

## 2025-01-02 DIAGNOSIS — R30.0 DYSURIA: ICD-10-CM

## 2025-01-02 DIAGNOSIS — I10 PRIMARY HYPERTENSION: Primary | ICD-10-CM

## 2025-01-02 LAB
BILIRUB BLD-MCNC: NEGATIVE MG/DL
CLARITY, POC: CLEAR
COLOR UR: YELLOW
EXPIRATION DATE: ABNORMAL
GLUCOSE UR STRIP-MCNC: NEGATIVE MG/DL
KETONES UR QL: NEGATIVE
LEUKOCYTE EST, POC: NEGATIVE
Lab: ABNORMAL
NITRITE UR-MCNC: NEGATIVE MG/ML
PH UR: 6 [PH] (ref 5–8)
PROT UR STRIP-MCNC: NEGATIVE MG/DL
RBC # UR STRIP: ABNORMAL /UL
SP GR UR: 1.01 (ref 1–1.03)
UROBILINOGEN UR QL: ABNORMAL

## 2025-01-02 PROCEDURE — 99213 OFFICE O/P EST LOW 20 MIN: CPT

## 2025-01-02 PROCEDURE — 3078F DIAST BP <80 MM HG: CPT

## 2025-01-02 PROCEDURE — 1160F RVW MEDS BY RX/DR IN RCRD: CPT

## 2025-01-02 PROCEDURE — 1159F MED LIST DOCD IN RCRD: CPT

## 2025-01-02 PROCEDURE — 3074F SYST BP LT 130 MM HG: CPT

## 2025-01-02 RX ORDER — ENALAPRIL MALEATE 10 MG/1
10 TABLET ORAL DAILY
Qty: 90 TABLET | Refills: 1
Start: 2025-01-02

## 2025-01-02 RX ORDER — SULFAMETHOXAZOLE AND TRIMETHOPRIM 800; 160 MG/1; MG/1
1 TABLET ORAL 2 TIMES DAILY
Qty: 10 TABLET | Refills: 0 | Status: SHIPPED | OUTPATIENT
Start: 2025-01-02 | End: 2025-01-07

## 2025-01-02 NOTE — PROGRESS NOTES
Chief Complaint  Hypertension (2 week f/u )    Subjective        Latha Radha Dawson presents to [facility]    History of Present Illness  34 year old female presents for hypertension and blood pressure check. Currently taking daily enalapril. BP is well controlled at 118/78. Denies chest pain and shortness of breath. She was recently treated for UTI with Macrobid but continues to report symptoms of dysuria and flank pain. Urinalysis in clinic shows trace blood. Will send for culture.     Interpretor used during exam, provided by [facility]       Objective   Vital Signs:  /78 (BP Location: Left arm, Patient Position: Sitting, Cuff Size: Adult)   Pulse 89   Temp 97.7 °F (36.5 °C) (Infrared)   Wt 78.8 kg (173 lb 12.8 oz)   SpO2 96%   There is no height or weight on file to calculate BMI.        Physical Exam  Constitutional:       Appearance: Normal appearance.   HENT:      Head: Normocephalic.   Eyes:      Conjunctiva/sclera: Conjunctivae normal.      Pupils: Pupils are equal, round, and reactive to light.   Cardiovascular:      Rate and Rhythm: Normal rate and regular rhythm.      Pulses: Normal pulses.      Heart sounds: Normal heart sounds.   Pulmonary:      Effort: Pulmonary effort is normal.      Breath sounds: Normal breath sounds.   Skin:     General: Skin is warm.   Neurological:      General: No focal deficit present.      Mental Status: She is alert and oriented to person, place, and time.   Psychiatric:         Mood and Affect: Mood normal.         Behavior: Behavior normal.          Result Review :  The following data was reviewed by: PRABHJOT Graf on 01/02/2025:  UA          10/21/2024    01:21 12/19/2024    09:42 1/2/2025    16:36   Urinalysis   Squamous Epithelial Cells, UA 3-6      Specific Gravity, UA 1.015      Ketones, UA Negative  Trace  Negative    Blood, UA Small (1+)      Leukocytes, UA Moderate (2+)  Large (3+)  Negative    Nitrite, UA Negative       RBC, UA 11-20      WBC, UA 6-10      Bacteria, UA None Seen        Data reviewed : urine results           Assessment and Plan   Diagnoses and all orders for this visit:    1. Primary hypertension (Primary)  Assessment & Plan:  Hypertension is stable and controlled  Continue current treatment regimen.  Dietary sodium restriction.  Weight loss.  Regular aerobic exercise.  Ambulatory blood pressure monitoring.  Blood pressure will be reassessed in 3 months.    Orders:  -     enalapril (VASOTEC) 10 MG tablet; Take 1 tablet by mouth Daily.  Dispense: 90 tablet; Refill: 1    2. Blood pressure check  -     enalapril (VASOTEC) 10 MG tablet; Take 1 tablet by mouth Daily.  Dispense: 90 tablet; Refill: 1    3. Acute cystitis with hematuria  -     POCT urinalysis dipstick, automated  -     Urine Culture - Urine, Urine, Clean Catch  -     sulfamethoxazole-trimethoprim (BACTRIM DS,SEPTRA DS) 800-160 MG per tablet; Take 1 tablet by mouth 2 (Two) Times a Day for 5 days.  Dispense: 10 tablet; Refill: 0    4. Dysuria  -     sulfamethoxazole-trimethoprim (BACTRIM DS,SEPTRA DS) 800-160 MG per tablet; Take 1 tablet by mouth 2 (Two) Times a Day for 5 days.  Dispense: 10 tablet; Refill: 0    5. Flank pain  -     sulfamethoxazole-trimethoprim (BACTRIM DS,SEPTRA DS) 800-160 MG per tablet; Take 1 tablet by mouth 2 (Two) Times a Day for 5 days.  Dispense: 10 tablet; Refill: 0             Follow Up   Return in about 3 months (around 4/2/2025) for Annual physical.  Patient was given instructions and counseling regarding her condition or for health maintenance advice. Please see specific information pulled into the AVS if appropriate.

## 2025-01-05 LAB
BACTERIA UR CULT: NO GROWTH
BACTERIA UR CULT: NORMAL

## 2025-01-09 ENCOUNTER — CLINICAL SUPPORT (OUTPATIENT)
Dept: OBSTETRICS AND GYNECOLOGY | Facility: CLINIC | Age: 35
End: 2025-01-09
Payer: COMMERCIAL

## 2025-01-09 VITALS — DIASTOLIC BLOOD PRESSURE: 70 MMHG | SYSTOLIC BLOOD PRESSURE: 114 MMHG | HEART RATE: 82 BPM | WEIGHT: 173 LBS

## 2025-01-09 DIAGNOSIS — Z30.42 DEPO-PROVERA CONTRACEPTIVE STATUS: Primary | ICD-10-CM

## 2025-01-09 RX ORDER — MEDROXYPROGESTERONE ACETATE 150 MG/ML
150 INJECTION, SUSPENSION INTRAMUSCULAR ONCE
Status: COMPLETED | OUTPATIENT
Start: 2025-01-09 | End: 2025-01-09

## 2025-01-09 RX ADMIN — MEDROXYPROGESTERONE ACETATE 150 MG: 150 INJECTION, SUSPENSION INTRAMUSCULAR at 10:35

## 2025-01-09 NOTE — PROGRESS NOTES
Here for pt supplied depo provera inj. Pt state she had her first dose in hospital after delivery. Pt did not have a reaction